# Patient Record
Sex: MALE | Race: WHITE | NOT HISPANIC OR LATINO | URBAN - METROPOLITAN AREA
[De-identification: names, ages, dates, MRNs, and addresses within clinical notes are randomized per-mention and may not be internally consistent; named-entity substitution may affect disease eponyms.]

---

## 2017-07-11 ENCOUNTER — OFFICE (AMBULATORY)
Dept: URBAN - METROPOLITAN AREA CLINIC 75 | Facility: CLINIC | Age: 37
End: 2017-07-11

## 2017-07-11 VITALS
HEIGHT: 74 IN | SYSTOLIC BLOOD PRESSURE: 132 MMHG | DIASTOLIC BLOOD PRESSURE: 70 MMHG | WEIGHT: 225 LBS | HEART RATE: 72 BPM

## 2017-07-11 DIAGNOSIS — R12 HEARTBURN: ICD-10-CM

## 2017-07-11 DIAGNOSIS — Z80.3 FAMILY HISTORY OF MALIGNANT NEOPLASM OF BREAST: ICD-10-CM

## 2017-07-11 PROCEDURE — 99204 OFFICE O/P NEW MOD 45 MIN: CPT

## 2017-07-24 ENCOUNTER — OFFICE (AMBULATORY)
Dept: URBAN - METROPOLITAN AREA CLINIC 75 | Facility: CLINIC | Age: 37
End: 2017-07-24

## 2017-07-24 DIAGNOSIS — Z80.3 FAMILY HISTORY OF MALIGNANT NEOPLASM OF BREAST: ICD-10-CM

## 2017-07-24 PROCEDURE — 82272 OCCULT BLD FECES 1-3 TESTS: CPT

## 2017-08-02 VITALS
WEIGHT: 225 LBS | HEART RATE: 71 BPM | RESPIRATION RATE: 18 BRPM | OXYGEN SATURATION: 98 % | RESPIRATION RATE: 16 BRPM | DIASTOLIC BLOOD PRESSURE: 67 MMHG | SYSTOLIC BLOOD PRESSURE: 138 MMHG | SYSTOLIC BLOOD PRESSURE: 84 MMHG | DIASTOLIC BLOOD PRESSURE: 65 MMHG | HEART RATE: 67 BPM | HEIGHT: 74 IN | RESPIRATION RATE: 17 BRPM | HEART RATE: 79 BPM | DIASTOLIC BLOOD PRESSURE: 77 MMHG | DIASTOLIC BLOOD PRESSURE: 86 MMHG | SYSTOLIC BLOOD PRESSURE: 134 MMHG | TEMPERATURE: 97.3 F | RESPIRATION RATE: 13 BRPM | TEMPERATURE: 97.5 F | RESPIRATION RATE: 8 BRPM | DIASTOLIC BLOOD PRESSURE: 56 MMHG | OXYGEN SATURATION: 97 % | DIASTOLIC BLOOD PRESSURE: 60 MMHG | HEART RATE: 77 BPM | SYSTOLIC BLOOD PRESSURE: 150 MMHG | HEART RATE: 75 BPM | SYSTOLIC BLOOD PRESSURE: 98 MMHG | OXYGEN SATURATION: 99 % | SYSTOLIC BLOOD PRESSURE: 93 MMHG | HEART RATE: 70 BPM

## 2017-08-07 ENCOUNTER — AMBULATORY SURGICAL CENTER (AMBULATORY)
Dept: URBAN - METROPOLITAN AREA SURGERY 17 | Facility: SURGERY | Age: 37
End: 2017-08-07

## 2017-08-07 ENCOUNTER — OFFICE (AMBULATORY)
Dept: URBAN - METROPOLITAN AREA CLINIC 64 | Facility: CLINIC | Age: 37
End: 2017-08-07

## 2017-08-07 DIAGNOSIS — K29.70 GASTRITIS, UNSPECIFIED, WITHOUT BLEEDING: ICD-10-CM

## 2017-08-07 DIAGNOSIS — K22.70 BARRETT'S ESOPHAGUS WITHOUT DYSPLASIA: ICD-10-CM

## 2017-08-07 DIAGNOSIS — K31.7 POLYP OF STOMACH AND DUODENUM: ICD-10-CM

## 2017-08-07 DIAGNOSIS — K29.50 UNSPECIFIED CHRONIC GASTRITIS WITHOUT BLEEDING: ICD-10-CM

## 2017-08-07 DIAGNOSIS — R12 HEARTBURN: ICD-10-CM

## 2017-08-07 DIAGNOSIS — K20.9 ESOPHAGITIS, UNSPECIFIED: ICD-10-CM

## 2017-08-07 LAB
GI HISTOLOGY: A. UNSPECIFIED: (no result)
GI HISTOLOGY: B. SELECT: (no result)
GI HISTOLOGY: C. UNSPECIFIED: (no result)
GI HISTOLOGY: D. UNSPECIFIED: (no result)
GI HISTOLOGY: PDF REPORT: (no result)

## 2017-08-07 PROCEDURE — 88342 IMHCHEM/IMCYTCHM 1ST ANTB: CPT | Mod: 26

## 2017-08-07 PROCEDURE — 88305 TISSUE EXAM BY PATHOLOGIST: CPT

## 2017-08-07 PROCEDURE — 43239 EGD BIOPSY SINGLE/MULTIPLE: CPT

## 2017-08-07 RX ADMIN — PROPOFOL 25 MG: 10 INJECTION, EMULSION INTRAVENOUS at 11:07

## 2017-08-07 RX ADMIN — PROPOFOL 25 MG: 10 INJECTION, EMULSION INTRAVENOUS at 11:09

## 2017-08-07 RX ADMIN — LIDOCAINE HYDROCHLORIDE 50 MG: 10 INJECTION, SOLUTION EPIDURAL; INFILTRATION; INTRACAUDAL; PERINEURAL at 11:02

## 2017-08-07 RX ADMIN — PROPOFOL 50 MG: 10 INJECTION, EMULSION INTRAVENOUS at 11:03

## 2017-08-07 RX ADMIN — PROPOFOL 100 MG: 10 INJECTION, EMULSION INTRAVENOUS at 11:02

## 2017-08-07 RX ADMIN — PROPOFOL 50 MG: 10 INJECTION, EMULSION INTRAVENOUS at 11:04

## 2017-08-07 RX ADMIN — PROPOFOL 50 MG: 10 INJECTION, EMULSION INTRAVENOUS at 11:05

## 2017-12-11 ENCOUNTER — OFFICE VISIT (OUTPATIENT)
Dept: INTERNAL MEDICINE | Facility: CLINIC | Age: 37
End: 2017-12-11

## 2017-12-11 VITALS
SYSTOLIC BLOOD PRESSURE: 128 MMHG | DIASTOLIC BLOOD PRESSURE: 75 MMHG | WEIGHT: 234.1 LBS | RESPIRATION RATE: 16 BRPM | HEIGHT: 74 IN | HEART RATE: 67 BPM | BODY MASS INDEX: 30.04 KG/M2 | TEMPERATURE: 98.2 F

## 2017-12-11 DIAGNOSIS — F41.9 ANXIETY: ICD-10-CM

## 2017-12-11 DIAGNOSIS — E66.3 OVERWEIGHT: ICD-10-CM

## 2017-12-11 DIAGNOSIS — Z00.00 HEALTH CARE MAINTENANCE: ICD-10-CM

## 2017-12-11 DIAGNOSIS — K21.00 GASTROESOPHAGEAL REFLUX DISEASE WITH ESOPHAGITIS: Primary | ICD-10-CM

## 2017-12-11 PROBLEM — J45.909 ASTHMA: Status: ACTIVE | Noted: 2017-12-11

## 2017-12-11 PROBLEM — K21.9 GERD (GASTROESOPHAGEAL REFLUX DISEASE): Status: ACTIVE | Noted: 2017-12-11

## 2017-12-11 PROCEDURE — 99385 PREV VISIT NEW AGE 18-39: CPT | Performed by: FAMILY MEDICINE

## 2017-12-11 RX ORDER — LEVOCETIRIZINE DIHYDROCHLORIDE 5 MG/1
5 TABLET, FILM COATED ORAL EVERY EVENING
COMMUNITY

## 2017-12-11 RX ORDER — PANTOPRAZOLE SODIUM 40 MG/1
40 TABLET, DELAYED RELEASE ORAL DAILY
COMMUNITY
Start: 2017-09-11 | End: 2018-03-09 | Stop reason: ALTCHOICE

## 2017-12-11 RX ORDER — FLUTICASONE PROPIONATE 50 MCG
2 SPRAY, SUSPENSION (ML) NASAL DAILY
COMMUNITY

## 2017-12-11 RX ORDER — ESCITALOPRAM OXALATE 10 MG/1
10 TABLET ORAL DAILY
COMMUNITY
Start: 2017-09-21 | End: 2021-03-24 | Stop reason: DRUGHIGH

## 2017-12-11 RX ORDER — AZELASTINE HCL 205.5 UG/1
1 SPRAY NASAL DAILY
COMMUNITY
Start: 2017-10-01

## 2017-12-11 NOTE — PROGRESS NOTES
Subjective   Jimi Villar is a 37 y.o. male.     Chief Complaint   Patient presents with   • new patient     needs new PCP, sees allergy dr, gastro, and physc for meds   • adult male physical         History of Present Illness   Jimi Villar 37 y.o. male who presents for an Annual Wellness Visit.  he has a history of   Patient Active Problem List   Diagnosis   • Asthma   • Anxiety   • Gastroesophageal reflux disease with esophagitis   • Health care maintenance   • Overweight   .  he has been feeling well.  Labs results discussed in detail with the patient.  Plan to update vaccines if needed today.  I  reviewed health maintenance with him as part of my preventative care plan.    Health Habits:  Dental Exam. up to date  Eye Exam. never  Exercise: 3 times/week.  Current exercise activities include: swimming      The following portions of the patient's history were reviewed and updated as appropriate: allergies, current medications, past family history, past medical history, past social history, past surgical history and problem list.    Review of Systems   Constitutional: Negative for appetite change, fever and unexpected weight change.   HENT: Negative for ear pain, facial swelling and sore throat.    Eyes: Negative for pain and visual disturbance.   Respiratory: Negative for chest tightness, shortness of breath and wheezing.    Cardiovascular: Negative for chest pain and palpitations.   Gastrointestinal: Negative for abdominal pain and blood in stool.   Endocrine: Negative.    Genitourinary: Negative for difficulty urinating and hematuria.   Musculoskeletal: Negative for joint swelling.   Neurological: Negative for tremors, seizures and syncope.   Hematological: Negative for adenopathy.   Psychiatric/Behavioral: Negative.        Objective   Physical Exam   Constitutional: He is oriented to person, place, and time. He appears well-developed and well-nourished. No distress.   HENT:   Head: Normocephalic and  atraumatic.   Right Ear: External ear normal.   Left Ear: External ear normal.   Mouth/Throat: Oropharynx is clear and moist.   Eyes: Conjunctivae and EOM are normal. Pupils are equal, round, and reactive to light. Right eye exhibits no discharge. Left eye exhibits no discharge. No scleral icterus.   Neck: Normal range of motion. Neck supple. No tracheal deviation present. No thyromegaly present.   Cardiovascular: Normal rate, regular rhythm, normal heart sounds, intact distal pulses and normal pulses.  Exam reveals no gallop.    No murmur heard.  Pulmonary/Chest: Effort normal and breath sounds normal. No respiratory distress. He has no wheezes. He has no rales.   Abdominal: Soft. Bowel sounds are normal.   Musculoskeletal: Normal range of motion. He exhibits no edema, tenderness or deformity.   Neurological: He is alert and oriented to person, place, and time. He exhibits normal muscle tone. Coordination normal.   Skin: Skin is warm. No rash noted. No erythema. No pallor.   Psychiatric: He has a normal mood and affect. His behavior is normal. Judgment and thought content normal.   Nursing note and vitals reviewed.      Assessment/Plan   Jimi was seen today for new patient and adult male physical.    Diagnoses and all orders for this visit:    Gastroesophageal reflux disease with esophagitis    Health care maintenance  -     Lipid Panel  -     Comprehensive Metabolic Panel    Anxiety    Overweight  -     Hemoglobin A1c  -     TSH      Continue routine follow-up with psychiatry psychologist as well as GI for ongoing maintenance management of Kaufman's esophagus, he is also following with an allergist.  Follow-up results of blood work and as needed or annually

## 2017-12-15 LAB
ALBUMIN SERPL-MCNC: 4.4 G/DL (ref 3.5–5.2)
ALBUMIN/GLOB SERPL: 1.6 G/DL
ALP SERPL-CCNC: 80 U/L (ref 39–117)
ALT SERPL-CCNC: 68 U/L (ref 1–41)
AST SERPL-CCNC: 67 U/L (ref 1–40)
BILIRUB SERPL-MCNC: 0.2 MG/DL (ref 0.1–1.2)
BUN SERPL-MCNC: 14 MG/DL (ref 6–20)
BUN/CREAT SERPL: 12.4 (ref 7–25)
CALCIUM SERPL-MCNC: 8.9 MG/DL (ref 8.6–10.5)
CHLORIDE SERPL-SCNC: 102 MMOL/L (ref 98–107)
CHOLEST SERPL-MCNC: 174 MG/DL (ref 0–200)
CO2 SERPL-SCNC: 26.3 MMOL/L (ref 22–29)
CREAT SERPL-MCNC: 1.13 MG/DL (ref 0.76–1.27)
GFR SERPLBLD CREATININE-BSD FMLA CKD-EPI: 72 ML/MIN/1.73
GFR SERPLBLD CREATININE-BSD FMLA CKD-EPI: 88 ML/MIN/1.73
GLOBULIN SER CALC-MCNC: 2.8 GM/DL
GLUCOSE SERPL-MCNC: 95 MG/DL (ref 65–99)
HBA1C MFR BLD: 5.37 % (ref 4.8–5.6)
HDLC SERPL-MCNC: 37 MG/DL (ref 40–60)
LDLC SERPL CALC-MCNC: 90 MG/DL (ref 0–100)
POTASSIUM SERPL-SCNC: 4.5 MMOL/L (ref 3.5–5.2)
PROT SERPL-MCNC: 7.2 G/DL (ref 6–8.5)
SODIUM SERPL-SCNC: 142 MMOL/L (ref 136–145)
TRIGL SERPL-MCNC: 237 MG/DL (ref 0–150)
TSH SERPL DL<=0.005 MIU/L-ACNC: 1.3 MIU/ML (ref 0.27–4.2)
VLDLC SERPL CALC-MCNC: 47.4 MG/DL (ref 5–40)

## 2017-12-19 ENCOUNTER — TELEPHONE (OUTPATIENT)
Dept: INTERNAL MEDICINE | Facility: CLINIC | Age: 37
End: 2017-12-19

## 2017-12-19 DIAGNOSIS — R74.8 ELEVATED LIVER ENZYMES: Primary | ICD-10-CM

## 2017-12-19 NOTE — TELEPHONE ENCOUNTER
----- Message from Dov Blanco MD sent at 12/18/2017  5:08 PM EST -----  Follow-up with Dr. Reid for elevated liver enzymes

## 2018-01-05 ENCOUNTER — OFFICE (AMBULATORY)
Dept: URBAN - METROPOLITAN AREA CLINIC 75 | Facility: CLINIC | Age: 38
End: 2018-01-05

## 2018-01-05 VITALS
WEIGHT: 232 LBS | HEART RATE: 76 BPM | DIASTOLIC BLOOD PRESSURE: 90 MMHG | SYSTOLIC BLOOD PRESSURE: 160 MMHG | HEIGHT: 74 IN

## 2018-01-05 DIAGNOSIS — K20.9 ESOPHAGITIS, UNSPECIFIED: ICD-10-CM

## 2018-01-05 DIAGNOSIS — R94.5 ABNORMAL RESULTS OF LIVER FUNCTION STUDIES: ICD-10-CM

## 2018-01-05 DIAGNOSIS — R12 HEARTBURN: ICD-10-CM

## 2018-01-05 DIAGNOSIS — Z80.3 FAMILY HISTORY OF MALIGNANT NEOPLASM OF BREAST: ICD-10-CM

## 2018-01-05 DIAGNOSIS — K29.70 GASTRITIS, UNSPECIFIED, WITHOUT BLEEDING: ICD-10-CM

## 2018-01-05 PROCEDURE — 99214 OFFICE O/P EST MOD 30 MIN: CPT

## 2018-01-23 ENCOUNTER — HOSPITAL ENCOUNTER (OUTPATIENT)
Dept: GENERAL RADIOLOGY | Facility: HOSPITAL | Age: 38
Discharge: HOME | End: 2018-01-23
Admitting: FAMILY MEDICINE

## 2018-01-23 ENCOUNTER — OFFICE VISIT (OUTPATIENT)
Dept: INTERNAL MEDICINE | Facility: CLINIC | Age: 38
End: 2018-01-23

## 2018-01-23 VITALS
HEIGHT: 74 IN | OXYGEN SATURATION: 98 % | SYSTOLIC BLOOD PRESSURE: 106 MMHG | WEIGHT: 225.3 LBS | HEART RATE: 70 BPM | BODY MASS INDEX: 28.91 KG/M2 | TEMPERATURE: 97 F | DIASTOLIC BLOOD PRESSURE: 62 MMHG

## 2018-01-23 DIAGNOSIS — R05.9 COUGH: Primary | ICD-10-CM

## 2018-01-23 DIAGNOSIS — J40 BRONCHITIS: ICD-10-CM

## 2018-01-23 PROCEDURE — 99213 OFFICE O/P EST LOW 20 MIN: CPT | Performed by: FAMILY MEDICINE

## 2018-01-23 PROCEDURE — 71046 X-RAY EXAM CHEST 2 VIEWS: CPT

## 2018-01-23 RX ORDER — AZITHROMYCIN 250 MG/1
TABLET, FILM COATED ORAL
Qty: 6 TABLET | Refills: 0 | Status: SHIPPED | OUTPATIENT
Start: 2018-01-23 | End: 2018-03-09

## 2018-01-23 RX ORDER — PREDNISONE 10 MG/1
TABLET ORAL
Qty: 15 TABLET | Refills: 0 | Status: SHIPPED | OUTPATIENT
Start: 2018-01-23 | End: 2018-03-09

## 2018-01-23 RX ORDER — ALBUTEROL SULFATE 90 UG/1
2 AEROSOL, METERED RESPIRATORY (INHALATION) EVERY 6 HOURS PRN
Qty: 1 INHALER | Refills: 1 | Status: SHIPPED | OUTPATIENT
Start: 2018-01-23

## 2018-01-23 RX ORDER — ALBUTEROL SULFATE 90 UG/1
1 AEROSOL, METERED RESPIRATORY (INHALATION) DAILY PRN
COMMUNITY
End: 2018-01-23 | Stop reason: SDUPTHER

## 2018-01-23 NOTE — PROGRESS NOTES
"Jimi Villar is a 37 y.o. male.      Assessment/Plan   Problem List Items Addressed This Visit        Respiratory    Cough - Primary    Relevant Orders    XR Chest 2 View    Bronchitis    Relevant Medications    albuterol (PROAIR HFA) 108 (90 Base) MCG/ACT inhaler           Follow-up results of chest x-ray  Prednisone with Z-Hussain follow-up if no significant improvement within a week      Chief Complaint   Patient presents with   • cough since [place]     Social History   Substance Use Topics   • Smoking status: Never Smoker   • Smokeless tobacco: Never Used   • Alcohol use Yes      Comment: 2-3 per day       History of Present Illness   With cough for greater than a month that was gradually improving then recurrence after flight to [place] with worsening sweats low-grade fever and productive sputum no green no muscle aches most her throat or earache he's been using her inhaler intermittently with some benefit but it's an old inhaler.   Allergist and no change in those medications  The following portions of the patient's history were reviewed and updated as appropriate:PMHroutine: Social history , Past Medical History, Allergies, Current Medications, Active Problem List and Health Maintenance    Review of Systems   Constitutional: Negative.    HENT: Negative.    Respiratory: Positive for cough and shortness of breath.    Gastrointestinal: Negative.    Hematological: Negative.        Objective   Vitals:    01/23/18 0823   BP: 106/62   BP Location: Left arm   Patient Position: Sitting   Cuff Size: Large Adult   Pulse: 70   Temp: 97 °F (36.1 °C)   TempSrc: Oral   SpO2: 98%   Weight: 102 kg (225 lb 4.8 oz)   Height: 188 cm (74\")     Body mass index is 28.93 kg/(m^2).  Physical Exam   Constitutional: He appears well-developed and well-nourished.   HENT:   Head: Normocephalic and atraumatic.   Right Ear: External ear normal.   Left Ear: External ear normal.   Nose: Nose normal.   Eyes: Conjunctivae are normal. Pupils " are equal, round, and reactive to light.   Neck: Normal range of motion.   Cardiovascular: Normal rate and regular rhythm.    Pulmonary/Chest: Effort normal. He has wheezes.   Lymphadenopathy:     He has no cervical adenopathy.   Psychiatric: He has a normal mood and affect. His behavior is normal. Judgment and thought content normal.   Nursing note and vitals reviewed.    Reviewed Data:  No visits with results within 1 Month(s) from this visit.  Latest known visit with results is:    Office Visit on 12/11/2017   Component Date Value Ref Range Status   • Total Cholesterol 12/15/2017 174  0 - 200 mg/dL Final   • Triglycerides 12/15/2017 237* 0 - 150 mg/dL Final   • HDL Cholesterol 12/15/2017 37* 40 - 60 mg/dL Final   • VLDL Cholesterol 12/15/2017 47.4* 5 - 40 mg/dL Final   • LDL Cholesterol  12/15/2017 90  0 - 100 mg/dL Final   • Glucose 12/15/2017 95  65 - 99 mg/dL Final   • BUN 12/15/2017 14  6 - 20 mg/dL Final   • Creatinine 12/15/2017 1.13  0.76 - 1.27 mg/dL Final   • eGFR Non  Am 12/15/2017 72  >60 mL/min/1.73 Final   • eGFR African Am 12/15/2017 88  >60 mL/min/1.73 Final   • BUN/Creatinine Ratio 12/15/2017 12.4  7.0 - 25.0 Final   • Sodium 12/15/2017 142  136 - 145 mmol/L Final   • Potassium 12/15/2017 4.5  3.5 - 5.2 mmol/L Final   • Chloride 12/15/2017 102  98 - 107 mmol/L Final   • Total CO2 12/15/2017 26.3  22.0 - 29.0 mmol/L Final   • Calcium 12/15/2017 8.9  8.6 - 10.5 mg/dL Final   • Total Protein 12/15/2017 7.2  6.0 - 8.5 g/dL Final   • Albumin 12/15/2017 4.40  3.50 - 5.20 g/dL Final   • Globulin 12/15/2017 2.8  gm/dL Final   • A/G Ratio 12/15/2017 1.6  g/dL Final   • Total Bilirubin 12/15/2017 0.2  0.1 - 1.2 mg/dL Final   • Alkaline Phosphatase 12/15/2017 80  39 - 117 U/L Final   • AST (SGOT) 12/15/2017 67* 1 - 40 U/L Final   • ALT (SGPT) 12/15/2017 68* 1 - 41 U/L Final   • Hemoglobin A1C 12/15/2017 5.37  4.80 - 5.60 % Final    Comment: Hemoglobin A1C Ranges:  Increased Risk for Diabetes  5.7% to  6.4%  Diabetes                     >= 6.5%  Diabetic Goal                < 7.0%     • TSH 12/15/2017 1.300  0.270 - 4.200 mIU/mL Final

## 2018-01-24 ENCOUNTER — TELEPHONE (OUTPATIENT)
Dept: INTERNAL MEDICINE | Facility: CLINIC | Age: 38
End: 2018-01-24

## 2018-01-24 NOTE — TELEPHONE ENCOUNTER
----- Message from Dov Blanco MD sent at 1/23/2018  4:55 PM EST -----  Chest x-ray no acute illness.  OK to Use prednisone

## 2018-02-09 ENCOUNTER — OFFICE (AMBULATORY)
Dept: URBAN - METROPOLITAN AREA CLINIC 75 | Facility: CLINIC | Age: 38
End: 2018-02-09

## 2018-02-09 VITALS
HEIGHT: 74 IN | HEART RATE: 80 BPM | SYSTOLIC BLOOD PRESSURE: 150 MMHG | DIASTOLIC BLOOD PRESSURE: 80 MMHG | WEIGHT: 230 LBS

## 2018-02-09 DIAGNOSIS — Z80.3 FAMILY HISTORY OF MALIGNANT NEOPLASM OF BREAST: ICD-10-CM

## 2018-02-09 DIAGNOSIS — R12 HEARTBURN: ICD-10-CM

## 2018-02-09 DIAGNOSIS — K29.70 GASTRITIS, UNSPECIFIED, WITHOUT BLEEDING: ICD-10-CM

## 2018-02-09 DIAGNOSIS — K20.9 ESOPHAGITIS, UNSPECIFIED: ICD-10-CM

## 2018-02-09 DIAGNOSIS — K22.70 BARRETT'S ESOPHAGUS WITHOUT DYSPLASIA: ICD-10-CM

## 2018-02-09 DIAGNOSIS — R94.5 ABNORMAL RESULTS OF LIVER FUNCTION STUDIES: ICD-10-CM

## 2018-02-09 PROCEDURE — 99214 OFFICE O/P EST MOD 30 MIN: CPT

## 2018-02-09 RX ORDER — LANSOPRAZOLE 30 MG/1
30 CAPSULE, DELAYED RELEASE PELLETS ORAL
Qty: 90 | Refills: 4 | Status: ACTIVE
Start: 2018-02-09

## 2018-03-09 ENCOUNTER — OFFICE VISIT (OUTPATIENT)
Dept: INTERNAL MEDICINE | Facility: CLINIC | Age: 38
End: 2018-03-09

## 2018-03-09 VITALS
WEIGHT: 219.2 LBS | HEART RATE: 62 BPM | DIASTOLIC BLOOD PRESSURE: 80 MMHG | HEIGHT: 74 IN | SYSTOLIC BLOOD PRESSURE: 112 MMHG | OXYGEN SATURATION: 98 % | TEMPERATURE: 97.9 F | BODY MASS INDEX: 28.13 KG/M2

## 2018-03-09 DIAGNOSIS — J45.40 MODERATE PERSISTENT ASTHMA WITHOUT COMPLICATION: Primary | ICD-10-CM

## 2018-03-09 PROBLEM — J40 BRONCHITIS: Status: RESOLVED | Noted: 2018-01-23 | Resolved: 2018-03-09

## 2018-03-09 PROCEDURE — 99213 OFFICE O/P EST LOW 20 MIN: CPT | Performed by: FAMILY MEDICINE

## 2018-03-09 RX ORDER — LANSOPRAZOLE 30 MG/1
30 CAPSULE, DELAYED RELEASE ORAL DAILY
COMMUNITY
Start: 2018-02-09 | End: 2018-07-18

## 2018-03-09 NOTE — PROGRESS NOTES
Jimi Villar is a 37 y.o. male.      Assessment/Plan   Problem List Items Addressed This Visit        Respiratory    Asthma - Primary    Relevant Medications    mometasone-formoterol (DULERA) 200-5 MCG/ACT inhaler             Start Dulera 200/52 puffs twice a day prescription sent to [facility] may need to be able to wean down after the spring allergy season 200/5 twice a day was follow-up as needed or in 6 months    Chief Complaint   Patient presents with   • Cough (continued)     since [place]     Social History   Substance Use Topics   • Smoking status: Never Smoker   • Smokeless tobacco: Never Used   • Alcohol use Yes      Comment: 2-3 per day       History of Present Illness   Patient has long-standing history of asthma that has been well controlled in the past with intermittent use of albuterol seen an allergist regularly he is taking medication without control his upper respiratory symptoms as well as oral antihistamine he has not been using any oral steroids although when he had his previous attacks seem to improve mostly with the oral steroid and then would have recurrence of symptoms has been taking his albuterol inhaler 3 times a day.  No fever no sweats and no chills  The following portions of the patient's history were reviewed and updated as appropriate:PMHroutine: Social history , Past Medical History, Allergies, Current Medications, Active Problem List, Family History and Health Maintenance    Review of Systems   Constitutional: Negative.    HENT: Negative.    Respiratory: Positive for cough and wheezing.    Cardiovascular: Negative.    Gastrointestinal: Negative.    Musculoskeletal: Negative.    Allergic/Immunologic: Positive for environmental allergies. Negative for immunocompromised state.   Neurological: Negative.        Objective   Vitals:    03/09/18 1032   BP: 112/80   BP Location: Right arm   Patient Position: Sitting   Cuff Size: Large Adult   Pulse: 62   Temp: 97.9 °F (36.6 °C)  "  TempSrc: Oral   SpO2: 98%   Weight: 99.4 kg (219 lb 3.2 oz)   Height: 188 cm (74\")     Body mass index is 28.14 kg/(m^2).  Physical Exam   Constitutional: He appears well-developed and well-nourished.   HENT:   Head: Normocephalic and atraumatic.   Right Ear: External ear normal.   Left Ear: External ear normal.   Mouth/Throat: Oropharynx is clear and moist.   Eyes: Conjunctivae are normal. Pupils are equal, round, and reactive to light.   Neck: No thyromegaly present.   Cardiovascular: Normal rate and regular rhythm.    Pulmonary/Chest: Effort normal and breath sounds normal.   Lymphadenopathy:     He has no cervical adenopathy.   Psychiatric: He has a normal mood and affect. His behavior is normal. Judgment and thought content normal.     Reviewed Data:  No visits with results within 1 Month(s) from this visit.  Latest known visit with results is:    Office Visit on 12/11/2017   Component Date Value Ref Range Status   • Total Cholesterol 12/15/2017 174  0 - 200 mg/dL Final   • Triglycerides 12/15/2017 237* 0 - 150 mg/dL Final   • HDL Cholesterol 12/15/2017 37* 40 - 60 mg/dL Final   • VLDL Cholesterol 12/15/2017 47.4* 5 - 40 mg/dL Final   • LDL Cholesterol  12/15/2017 90  0 - 100 mg/dL Final   • Glucose 12/15/2017 95  65 - 99 mg/dL Final   • BUN 12/15/2017 14  6 - 20 mg/dL Final   • Creatinine 12/15/2017 1.13  0.76 - 1.27 mg/dL Final   • eGFR Non  Am 12/15/2017 72  >60 mL/min/1.73 Final   • eGFR African Am 12/15/2017 88  >60 mL/min/1.73 Final   • BUN/Creatinine Ratio 12/15/2017 12.4  7.0 - 25.0 Final   • Sodium 12/15/2017 142  136 - 145 mmol/L Final   • Potassium 12/15/2017 4.5  3.5 - 5.2 mmol/L Final   • Chloride 12/15/2017 102  98 - 107 mmol/L Final   • Total CO2 12/15/2017 26.3  22.0 - 29.0 mmol/L Final   • Calcium 12/15/2017 8.9  8.6 - 10.5 mg/dL Final   • Total Protein 12/15/2017 7.2  6.0 - 8.5 g/dL Final   • Albumin 12/15/2017 4.40  3.50 - 5.20 g/dL Final   • Globulin 12/15/2017 2.8  gm/dL Final   • " A/G Ratio 12/15/2017 1.6  g/dL Final   • Total Bilirubin 12/15/2017 0.2  0.1 - 1.2 mg/dL Final   • Alkaline Phosphatase 12/15/2017 80  39 - 117 U/L Final   • AST (SGOT) 12/15/2017 67* 1 - 40 U/L Final   • ALT (SGPT) 12/15/2017 68* 1 - 41 U/L Final   • Hemoglobin A1C 12/15/2017 5.37  4.80 - 5.60 % Final    Comment: Hemoglobin A1C Ranges:  Increased Risk for Diabetes  5.7% to 6.4%  Diabetes                     >= 6.5%  Diabetic Goal                < 7.0%     • TSH 12/15/2017 1.300  0.270 - 4.200 mIU/mL Final

## 2018-06-19 ENCOUNTER — OFFICE (AMBULATORY)
Dept: URBAN - METROPOLITAN AREA CLINIC 75 | Facility: CLINIC | Age: 38
End: 2018-06-19

## 2018-06-19 VITALS
SYSTOLIC BLOOD PRESSURE: 120 MMHG | WEIGHT: 217 LBS | HEART RATE: 68 BPM | HEIGHT: 74 IN | DIASTOLIC BLOOD PRESSURE: 80 MMHG

## 2018-06-19 DIAGNOSIS — Z80.3 FAMILY HISTORY OF MALIGNANT NEOPLASM OF BREAST: ICD-10-CM

## 2018-06-19 DIAGNOSIS — K31.7 POLYP OF STOMACH AND DUODENUM: ICD-10-CM

## 2018-06-19 DIAGNOSIS — R12 HEARTBURN: ICD-10-CM

## 2018-06-19 DIAGNOSIS — R94.5 ABNORMAL RESULTS OF LIVER FUNCTION STUDIES: ICD-10-CM

## 2018-06-19 DIAGNOSIS — K22.70 BARRETT'S ESOPHAGUS WITHOUT DYSPLASIA: ICD-10-CM

## 2018-06-19 PROCEDURE — 99214 OFFICE O/P EST MOD 30 MIN: CPT

## 2018-07-18 ENCOUNTER — OFFICE VISIT (OUTPATIENT)
Dept: INTERNAL MEDICINE | Facility: CLINIC | Age: 38
End: 2018-07-18

## 2018-07-18 VITALS
SYSTOLIC BLOOD PRESSURE: 100 MMHG | HEIGHT: 74 IN | WEIGHT: 212 LBS | BODY MASS INDEX: 27.21 KG/M2 | HEART RATE: 74 BPM | DIASTOLIC BLOOD PRESSURE: 66 MMHG | TEMPERATURE: 98.3 F | OXYGEN SATURATION: 99 %

## 2018-07-18 DIAGNOSIS — K21.00 GASTROESOPHAGEAL REFLUX DISEASE WITH ESOPHAGITIS: Primary | ICD-10-CM

## 2018-07-18 DIAGNOSIS — R05.9 COUGH: ICD-10-CM

## 2018-07-18 DIAGNOSIS — J02.9 SORE THROAT: ICD-10-CM

## 2018-07-18 PROCEDURE — 99213 OFFICE O/P EST LOW 20 MIN: CPT | Performed by: FAMILY MEDICINE

## 2018-07-18 RX ORDER — DEXLANSOPRAZOLE 60 MG/1
60 CAPSULE, DELAYED RELEASE ORAL DAILY
Qty: 30 CAPSULE | Refills: 0 | Status: SHIPPED | OUTPATIENT
Start: 2018-07-18 | End: 2018-08-17

## 2018-07-18 RX ORDER — AMOXICILLIN AND CLAVULANATE POTASSIUM 875; 125 MG/1; MG/1
TABLET, FILM COATED ORAL
COMMUNITY
Start: 2018-07-11 | End: 2018-07-18

## 2018-07-18 NOTE — PROGRESS NOTES
"Jimi Villar is a 37 y.o. male.      Assessment/Plan   Problem List Items Addressed This Visit        Respiratory    Cough    Sore throat       Digestive    Gastroesophageal reflux disease with esophagitis - Primary    Overview     [facility] EDG 8/ 2017         Relevant Medications    dexlansoprazole (DEXILANT) 60 MG capsule           Recommend consultation with ENT for voice change and persistent sore throat many months with history of GERD      Chief Complaint   Patient presents with   • [facility] follow up to sinus infection     Social History   Substance Use Topics   • Smoking status: Never Smoker   • Smokeless tobacco: Never Used   • Alcohol use Yes      Comment: 2-3 per day       History of Present Illness   Patient follow-up from recent urgent care visit given antibiotics and inhaler for sore throat that has been long-standing and he has had recent EGD which was normal his taking PPI Terrell Hakeem because his insurance would not pay for excellent although it has worked best for him he also has persistent cough is never had any visualization of his throat in feels that minimal improvement with antibiotics  The following portions of the patient's history were reviewed and updated as appropriate:PMHroutine: Social history , Past Medical History, Surgical history , Allergies, Current Medications, Active Problem List, Family History and Health Maintenance    Review of Systems   Constitutional: Negative.    HENT: Positive for congestion, sore throat and voice change. Negative for nosebleeds, postnasal drip and rhinorrhea.    Respiratory: Positive for cough. Negative for choking and chest tightness.    Cardiovascular: Negative.    Gastrointestinal: Negative.        Objective   Vitals:    07/18/18 1102   BP: 100/66   BP Location: Left arm   Patient Position: Sitting   Cuff Size: Large Adult   Pulse: 74   Temp: 98.3 °F (36.8 °C)   TempSrc: Oral   SpO2: 99%   Weight: 96.2 kg (212 lb)   Height: 188 cm (74\") "     Body mass index is 27.22 kg/m².  Physical Exam   Constitutional: He appears well-developed and well-nourished.   HENT:   Head: Normocephalic and atraumatic.   Left Ear: External ear normal.   Nose: Nose normal.   Eyes: Pupils are equal, round, and reactive to light. Conjunctivae are normal. No scleral icterus.   Neck: Normal range of motion. No thyromegaly present.   Cardiovascular: Normal rate and regular rhythm.    Pulmonary/Chest: Effort normal and breath sounds normal.   Musculoskeletal: He exhibits no edema.   Lymphadenopathy:     He has no cervical adenopathy.   Nursing note and vitals reviewed.    Reviewed Data:  No visits with results within 1 Month(s) from this visit.   Latest known visit with results is:   Office Visit on 12/11/2017   Component Date Value Ref Range Status   • Total Cholesterol 12/15/2017 174  0 - 200 mg/dL Final   • Triglycerides 12/15/2017 237* 0 - 150 mg/dL Final   • HDL Cholesterol 12/15/2017 37* 40 - 60 mg/dL Final   • VLDL Cholesterol 12/15/2017 47.4* 5 - 40 mg/dL Final   • LDL Cholesterol  12/15/2017 90  0 - 100 mg/dL Final   • Glucose 12/15/2017 95  65 - 99 mg/dL Final   • BUN 12/15/2017 14  6 - 20 mg/dL Final   • Creatinine 12/15/2017 1.13  0.76 - 1.27 mg/dL Final   • eGFR Non  Am 12/15/2017 72  >60 mL/min/1.73 Final   • eGFR African Am 12/15/2017 88  >60 mL/min/1.73 Final   • BUN/Creatinine Ratio 12/15/2017 12.4  7.0 - 25.0 Final   • Sodium 12/15/2017 142  136 - 145 mmol/L Final   • Potassium 12/15/2017 4.5  3.5 - 5.2 mmol/L Final   • Chloride 12/15/2017 102  98 - 107 mmol/L Final   • Total CO2 12/15/2017 26.3  22.0 - 29.0 mmol/L Final   • Calcium 12/15/2017 8.9  8.6 - 10.5 mg/dL Final   • Total Protein 12/15/2017 7.2  6.0 - 8.5 g/dL Final   • Albumin 12/15/2017 4.40  3.50 - 5.20 g/dL Final   • Globulin 12/15/2017 2.8  gm/dL Final   • A/G Ratio 12/15/2017 1.6  g/dL Final   • Total Bilirubin 12/15/2017 0.2  0.1 - 1.2 mg/dL Final   • Alkaline Phosphatase 12/15/2017 80  39  - 117 U/L Final   • AST (SGOT) 12/15/2017 67* 1 - 40 U/L Final   • ALT (SGPT) 12/15/2017 68* 1 - 41 U/L Final   • Hemoglobin A1C 12/15/2017 5.37  4.80 - 5.60 % Final    Comment: Hemoglobin A1C Ranges:  Increased Risk for Diabetes  5.7% to 6.4%  Diabetes                     >= 6.5%  Diabetic Goal                < 7.0%     • TSH 12/15/2017 1.300  0.270 - 4.200 mIU/mL Final

## 2018-08-15 ENCOUNTER — TELEPHONE (OUTPATIENT)
Dept: INTERNAL MEDICINE | Facility: CLINIC | Age: 38
End: 2018-08-15

## 2018-08-15 NOTE — TELEPHONE ENCOUNTER
Dexilant PA denied.  Patient notified, insurance states patient must try and fail the following omeprazole, pantoprazole, esomeprazole, rabeprazole.  Patient states he will see his GI for rx

## 2019-02-12 ENCOUNTER — OFFICE VISIT (OUTPATIENT)
Dept: INTERNAL MEDICINE | Facility: CLINIC | Age: 39
End: 2019-02-12

## 2019-02-12 VITALS
HEIGHT: 74 IN | WEIGHT: 215.9 LBS | OXYGEN SATURATION: 99 % | SYSTOLIC BLOOD PRESSURE: 112 MMHG | HEART RATE: 71 BPM | DIASTOLIC BLOOD PRESSURE: 64 MMHG | RESPIRATION RATE: 16 BRPM | BODY MASS INDEX: 27.71 KG/M2

## 2019-02-12 DIAGNOSIS — M79.18 MUSCULOSKELETAL PAIN: Primary | ICD-10-CM

## 2019-02-12 PROCEDURE — 99213 OFFICE O/P EST LOW 20 MIN: CPT | Performed by: FAMILY MEDICINE

## 2019-02-12 RX ORDER — ESCITALOPRAM OXALATE 5 MG/1
5 TABLET ORAL DAILY
COMMUNITY
Start: 2018-05-01 | End: 2021-03-24 | Stop reason: DRUGHIGH

## 2019-02-12 NOTE — PROGRESS NOTES
"Jimi Villar is a 38 y.o. male.      Assessment/Plan   Problem List Items Addressed This Visit        Nervous and Auditory    Musculoskeletal pain - Primary         Recommend anti-inflammatories for groin pain if worsens or becomes testicular in nature follow-up appointment        Chief Complaint   Patient presents with   • left side abdominal and testicle pain     x 3 weeks     Social History     Tobacco Use   • Smoking status: Never Smoker   • Smokeless tobacco: Never Used   Substance Use Topics   • Alcohol use: Yes     Comment: 2-3 per day   • Drug use: No       History of Present Illness   Patient comes in for pain in his left groin for the last 3 weeks or so he is increase his physical activities and attempts to get in shape to go skiing he does not remember any acute injury has been doing some lifting stretching squatting there is no family history of testicular disorders or hernias pain is persistent seems to initiate in the groin and shoot into his left scrotum the following portions of the patient's history were reviewed and updated as appropriate:PMHroutine: Social history , Past Medical History, Allergies, Current Medications, Active Problem List and Health Maintenance    Review of Systems   Constitutional: Negative.    Respiratory: Negative.    Cardiovascular: Negative.    Gastrointestinal: Negative.    Genitourinary: Negative for decreased urine volume, difficulty urinating, flank pain, frequency and scrotal swelling.       Objective   Vitals:    02/12/19 0958   BP: 112/64   BP Location: Left arm   Patient Position: Sitting   Cuff Size: Large Adult   Pulse: 71   Resp: 16   SpO2: 99%   Weight: 97.9 kg (215 lb 14.4 oz)   Height: 188 cm (74\")     Body mass index is 27.72 kg/m².  Physical Exam   Constitutional: He appears well-developed and well-nourished.   Abdominal: Soft. Bowel sounds are normal. He exhibits no distension and no mass. There is tenderness. There is no rebound and no guarding. No " hernia. Hernia confirmed negative in the left inguinal area.   Genitourinary: Rectum normal and penis normal. Right testis shows no swelling and no tenderness. Right testis is descended. Cremasteric reflex is not absent on the right side. Left testis shows no swelling and no tenderness. Left testis is descended. Cremasteric reflex is not absent on the left side.         Lymphadenopathy: No inguinal adenopathy noted on the left side.   Nursing note and vitals reviewed.    Reviewed Data:  No visits with results within 1 Month(s) from this visit.   Latest known visit with results is:   Office Visit on 12/11/2017   Component Date Value Ref Range Status   • Total Cholesterol 12/15/2017 174  0 - 200 mg/dL Final   • Triglycerides 12/15/2017 237* 0 - 150 mg/dL Final   • HDL Cholesterol 12/15/2017 37* 40 - 60 mg/dL Final   • VLDL Cholesterol 12/15/2017 47.4* 5 - 40 mg/dL Final   • LDL Cholesterol  12/15/2017 90  0 - 100 mg/dL Final   • Glucose 12/15/2017 95  65 - 99 mg/dL Final   • BUN 12/15/2017 14  6 - 20 mg/dL Final   • Creatinine 12/15/2017 1.13  0.76 - 1.27 mg/dL Final   • eGFR Non  Am 12/15/2017 72  >60 mL/min/1.73 Final   • eGFR African Am 12/15/2017 88  >60 mL/min/1.73 Final   • BUN/Creatinine Ratio 12/15/2017 12.4  7.0 - 25.0 Final   • Sodium 12/15/2017 142  136 - 145 mmol/L Final   • Potassium 12/15/2017 4.5  3.5 - 5.2 mmol/L Final   • Chloride 12/15/2017 102  98 - 107 mmol/L Final   • Total CO2 12/15/2017 26.3  22.0 - 29.0 mmol/L Final   • Calcium 12/15/2017 8.9  8.6 - 10.5 mg/dL Final   • Total Protein 12/15/2017 7.2  6.0 - 8.5 g/dL Final   • Albumin 12/15/2017 4.40  3.50 - 5.20 g/dL Final   • Globulin 12/15/2017 2.8  gm/dL Final   • A/G Ratio 12/15/2017 1.6  g/dL Final   • Total Bilirubin 12/15/2017 0.2  0.1 - 1.2 mg/dL Final   • Alkaline Phosphatase 12/15/2017 80  39 - 117 U/L Final   • AST (SGOT) 12/15/2017 67* 1 - 40 U/L Final   • ALT (SGPT) 12/15/2017 68* 1 - 41 U/L Final   • Hemoglobin A1C 12/15/2017  5.37  4.80 - 5.60 % Final    Comment: Hemoglobin A1C Ranges:  Increased Risk for Diabetes  5.7% to 6.4%  Diabetes                     >= 6.5%  Diabetic Goal                < 7.0%     • TSH 12/15/2017 1.300  0.270 - 4.200 mIU/mL Final

## 2019-04-09 ENCOUNTER — TELEPHONE (OUTPATIENT)
Dept: INTERNAL MEDICINE | Facility: CLINIC | Age: 39
End: 2019-04-09

## 2019-04-09 NOTE — TELEPHONE ENCOUNTER
Regarding: Visit Follow-Up Question  Contact: [phone]  .    ----- Message -----  From: Jimi Villar  Sent: 4/8/2019   5:28 PM  To: Esvin Montero [facility]  Subject: Visit Follow-Up Question                         ----- Message from Mychart, Generic sent at 4/8/2019  5:28 PM EDT -----    The pain that prompted my last visit is still an issue.  It is not constant, but continues to persist.  It continues to hurt throughout my lower abdomen and has occasionally even been in my lower back.  Checking to see if there is a logical next step or if I need to schedule a follow-up appointment?

## 2019-04-12 ENCOUNTER — OFFICE VISIT (OUTPATIENT)
Dept: INTERNAL MEDICINE | Facility: CLINIC | Age: 39
End: 2019-04-12

## 2019-04-12 VITALS
BODY MASS INDEX: 28.02 KG/M2 | HEIGHT: 76 IN | TEMPERATURE: 98.1 F | SYSTOLIC BLOOD PRESSURE: 120 MMHG | OXYGEN SATURATION: 97 % | HEART RATE: 72 BPM | DIASTOLIC BLOOD PRESSURE: 72 MMHG | WEIGHT: 230.1 LBS

## 2019-04-12 DIAGNOSIS — K40.90 NON-RECURRENT UNILATERAL INGUINAL HERNIA WITHOUT OBSTRUCTION OR GANGRENE: Primary | ICD-10-CM

## 2019-04-12 PROCEDURE — 99213 OFFICE O/P EST LOW 20 MIN: CPT | Performed by: FAMILY MEDICINE

## 2019-04-12 NOTE — PROGRESS NOTES
"Jimi Villar is a 38 y.o. male.      Assessment/Plan   Problem List Items Addressed This Visit        Other    Non-recurrent unilateral inguinal hernia without obstruction or gangrene - Primary    Overview     Right                Patient declines general surgery consultation since he is gradually feeling better monitor for signs and symptoms of deterioration follow-up otherwise as needed  Return if symptoms worsen or fail to improve, for Recheck.      Chief Complaint   Patient presents with   • Follow Up to Abdominal Pain     6 week follow up, slightly better     Social History     Tobacco Use   • Smoking status: Never Smoker   • Smokeless tobacco: Never Used   Substance Use Topics   • Alcohol use: Yes     Comment: 2-3 per day   • Drug use: No       History of Present Illness   Patient was 6 weeks abdominal pain had use anti-inflammatories intermittently and gradually this last week is much improved its more of an annoyance than persistent pain not related any specific activity is been no effective sexual activity no change in bowel habits no urine habit changes no fever sweats or chills points to the pelvic region but just discomfort is not having testicle tenderness  The following portions of the patient's history were reviewed and updated as appropriate:PMHroutine: Social history , Allergies, Current Medications, Active Problem List and Health Maintenance    Review of Systems   Constitutional: Negative.    Cardiovascular: Negative.    Genitourinary: Negative.    Musculoskeletal: Negative.    Psychiatric/Behavioral: The patient is nervous/anxious.        Objective   Vitals:    04/12/19 1440   BP: 120/72   BP Location: Right arm   Patient Position: Sitting   Cuff Size: Adult   Pulse: 72   Temp: 98.1 °F (36.7 °C)   TempSrc: Oral   SpO2: 97%   Weight: 104 kg (230 lb 1.6 oz)   Height: 193 cm (76\")     Body mass index is 28.01 kg/m².  Physical Exam   Constitutional: He is oriented to person, place, and time. He " appears well-developed and well-nourished.   Eyes: Pupils are equal, round, and reactive to light. No scleral icterus.   Abdominal: Soft. Bowel sounds are normal. A hernia is present.   Right inguinal hernia   Neurological: He is alert and oriented to person, place, and time.   Nursing note and vitals reviewed.    Reviewed Data:  No visits with results within 1 Month(s) from this visit.   Latest known visit with results is:   Office Visit on 12/11/2017   Component Date Value Ref Range Status   • Total Cholesterol 12/15/2017 174  0 - 200 mg/dL Final   • Triglycerides 12/15/2017 237* 0 - 150 mg/dL Final   • HDL Cholesterol 12/15/2017 37* 40 - 60 mg/dL Final   • VLDL Cholesterol 12/15/2017 47.4* 5 - 40 mg/dL Final   • LDL Cholesterol  12/15/2017 90  0 - 100 mg/dL Final   • Glucose 12/15/2017 95  65 - 99 mg/dL Final   • BUN 12/15/2017 14  6 - 20 mg/dL Final   • Creatinine 12/15/2017 1.13  0.76 - 1.27 mg/dL Final   • eGFR Non  Am 12/15/2017 72  >60 mL/min/1.73 Final   • eGFR African Am 12/15/2017 88  >60 mL/min/1.73 Final   • BUN/Creatinine Ratio 12/15/2017 12.4  7.0 - 25.0 Final   • Sodium 12/15/2017 142  136 - 145 mmol/L Final   • Potassium 12/15/2017 4.5  3.5 - 5.2 mmol/L Final   • Chloride 12/15/2017 102  98 - 107 mmol/L Final   • Total CO2 12/15/2017 26.3  22.0 - 29.0 mmol/L Final   • Calcium 12/15/2017 8.9  8.6 - 10.5 mg/dL Final   • Total Protein 12/15/2017 7.2  6.0 - 8.5 g/dL Final   • Albumin 12/15/2017 4.40  3.50 - 5.20 g/dL Final   • Globulin 12/15/2017 2.8  gm/dL Final   • A/G Ratio 12/15/2017 1.6  g/dL Final   • Total Bilirubin 12/15/2017 0.2  0.1 - 1.2 mg/dL Final   • Alkaline Phosphatase 12/15/2017 80  39 - 117 U/L Final   • AST (SGOT) 12/15/2017 67* 1 - 40 U/L Final   • ALT (SGPT) 12/15/2017 68* 1 - 41 U/L Final   • Hemoglobin A1C 12/15/2017 5.37  4.80 - 5.60 % Final    Comment: Hemoglobin A1C Ranges:  Increased Risk for Diabetes  5.7% to 6.4%  Diabetes                     >= 6.5%  Diabetic Goal                 < 7.0%     • TSH 12/15/2017 1.300  0.270 - 4.200 mIU/mL Final

## 2019-04-19 ENCOUNTER — OFFICE (AMBULATORY)
Dept: URBAN - METROPOLITAN AREA CLINIC 75 | Facility: CLINIC | Age: 39
End: 2019-04-19
Payer: COMMERCIAL

## 2019-04-19 VITALS
HEIGHT: 74 IN | SYSTOLIC BLOOD PRESSURE: 148 MMHG | DIASTOLIC BLOOD PRESSURE: 90 MMHG | WEIGHT: 221 LBS | RESPIRATION RATE: 15 BRPM | HEART RATE: 57 BPM

## 2019-04-19 DIAGNOSIS — R10.32 LEFT LOWER QUADRANT PAIN: ICD-10-CM

## 2019-04-19 DIAGNOSIS — R94.5 ABNORMAL RESULTS OF LIVER FUNCTION STUDIES: ICD-10-CM

## 2019-04-19 DIAGNOSIS — K22.70 BARRETT'S ESOPHAGUS WITHOUT DYSPLASIA: ICD-10-CM

## 2019-04-19 DIAGNOSIS — Z80.3 FAMILY HISTORY OF MALIGNANT NEOPLASM OF BREAST: ICD-10-CM

## 2019-04-19 PROCEDURE — 99214 OFFICE O/P EST MOD 30 MIN: CPT

## 2019-05-02 ENCOUNTER — TRANSCRIBE ORDERS (OUTPATIENT)
Dept: ADMINISTRATIVE | Facility: HOSPITAL | Age: 39
End: 2019-05-02

## 2019-05-02 DIAGNOSIS — R79.89 ELEVATED LFTS: Primary | ICD-10-CM

## 2019-05-02 DIAGNOSIS — R10.32 ABDOMINAL PAIN, LEFT LOWER QUADRANT: ICD-10-CM

## 2019-05-10 ENCOUNTER — HOSPITAL ENCOUNTER (OUTPATIENT)
Dept: CT IMAGING | Facility: HOSPITAL | Age: 39
Discharge: HOME | End: 2019-05-10
Admitting: INTERNAL MEDICINE

## 2019-05-10 DIAGNOSIS — R10.32 ABDOMINAL PAIN, LEFT LOWER QUADRANT: ICD-10-CM

## 2019-05-10 DIAGNOSIS — R79.89 ELEVATED LFTS: ICD-10-CM

## 2019-05-10 PROCEDURE — 25010000002 IOPAMIDOL 61 % SOLUTION: Performed by: INTERNAL MEDICINE

## 2019-05-10 PROCEDURE — 74177 CT ABD & PELVIS W/CONTRAST: CPT

## 2019-05-10 PROCEDURE — 0 DIATRIZOATE MEGLUMINE & SODIUM PER 1 ML: Performed by: INTERNAL MEDICINE

## 2019-05-10 RX ADMIN — IOPAMIDOL 85 ML: 612 INJECTION, SOLUTION INTRAVENOUS at 11:06

## 2019-05-10 RX ADMIN — DIATRIZOATE MEGLUMINE AND DIATRIZOATE SODIUM 30 ML: 660; 100 LIQUID ORAL; RECTAL at 11:06

## 2019-05-22 ENCOUNTER — TELEPHONE (OUTPATIENT)
Dept: INTERNAL MEDICINE | Facility: CLINIC | Age: 39
End: 2019-05-22

## 2019-05-22 NOTE — TELEPHONE ENCOUNTER
LMA - patient notified.  Appointment scheduled for tomorrow.  Patient asked to call the office to reschedule appointment if needed.

## 2019-05-22 NOTE — TELEPHONE ENCOUNTER
----- Message from Dov Blanco MD sent at 5/21/2019  1:13 PM EDT -----  Follow-up appointment UA and discuss bladder abnormality on CT scan

## 2019-05-23 ENCOUNTER — OFFICE VISIT (OUTPATIENT)
Dept: INTERNAL MEDICINE | Facility: CLINIC | Age: 39
End: 2019-05-23

## 2019-05-23 VITALS
OXYGEN SATURATION: 100 % | TEMPERATURE: 97.6 F | WEIGHT: 225.2 LBS | HEART RATE: 71 BPM | SYSTOLIC BLOOD PRESSURE: 124 MMHG | DIASTOLIC BLOOD PRESSURE: 82 MMHG | BODY MASS INDEX: 27.41 KG/M2

## 2019-05-23 DIAGNOSIS — N32.9 LESION OF BLADDER: Primary | ICD-10-CM

## 2019-05-23 LAB
BILIRUB BLD-MCNC: NEGATIVE MG/DL
CLARITY, POC: CLEAR
COLOR UR: YELLOW
GLUCOSE UR STRIP-MCNC: NEGATIVE MG/DL
KETONES UR QL: NEGATIVE
LEUKOCYTE EST, POC: NEGATIVE
NITRITE UR-MCNC: NEGATIVE MG/ML
PH UR: 6 [PH] (ref 5–8)
PROT UR STRIP-MCNC: NEGATIVE MG/DL
RBC # UR STRIP: NEGATIVE /UL
SP GR UR: 1.02 (ref 1–1.03)
UROBILINOGEN UR QL: NORMAL

## 2019-05-23 PROCEDURE — 81003 URINALYSIS AUTO W/O SCOPE: CPT | Performed by: FAMILY MEDICINE

## 2019-05-23 PROCEDURE — 99213 OFFICE O/P EST LOW 20 MIN: CPT | Performed by: FAMILY MEDICINE

## 2019-05-23 RX ORDER — LANSOPRAZOLE 30 MG/1
30 CAPSULE, DELAYED RELEASE ORAL DAILY
COMMUNITY

## 2019-05-23 NOTE — PROGRESS NOTES
Jimi Villar is a 38 y.o. male.      Assessment/Plan   Problem List Items Addressed This Visit        Genitourinary    Lesion of bladder - Primary    Relevant Orders    Ambulatory Referral to Urology         Patient has had previous vasectomy with Dr. Molina    Return if symptoms worsen or fail to improve, for Recheck.      Chief Complaint   Patient presents with   • discuss CT results     Social History     Tobacco Use   • Smoking status: Never Smoker   • Smokeless tobacco: Never Used   Substance Use Topics   • Alcohol use: Yes     Comment: 2-3 per day   • Drug use: No       History of Present Illness   Patient follow-up for incidental finding of new problem bladder lesion found on CT scan during routine GI evaluation patient has not had any urinary symptoms other than some vague pelvic pain thought to be hernia or diverticulosis CT scan reveals some letter wall thickening he does not specifically have any urinary symptoms UA today is unremarkable  The following portions of the patient's history were reviewed and updated as appropriate:PMHroutine: Social history , Allergies, Current Medications, Active Problem List and Health Maintenance    Review of Systems   Constitutional: Negative.    Cardiovascular: Negative.    Genitourinary: Negative.    Musculoskeletal: Negative.        Objective   Vitals:    05/23/19 0751   BP: 124/82   BP Location: Left arm   Patient Position: Sitting   Cuff Size: Adult   Pulse: 71   Temp: 97.6 °F (36.4 °C)   TempSrc: Oral   SpO2: 100%   Weight: 102 kg (225 lb 3.2 oz)     Body mass index is 27.41 kg/m².  Physical Exam   Constitutional: He appears well-developed and well-nourished.   HENT:   Head: Normocephalic and atraumatic.   Cardiovascular: Normal rate.   Pulmonary/Chest: Effort normal.   Psychiatric: He has a normal mood and affect. His behavior is normal. Judgment and thought content normal.   Nursing note and vitals reviewed.    Reviewed Data:  No visits with results within 1  Month(s) from this visit.   Latest known visit with results is:   Office Visit on 12/11/2017   Component Date Value Ref Range Status   • Total Cholesterol 12/15/2017 174  0 - 200 mg/dL Final   • Triglycerides 12/15/2017 237* 0 - 150 mg/dL Final   • HDL Cholesterol 12/15/2017 37* 40 - 60 mg/dL Final   • VLDL Cholesterol 12/15/2017 47.4* 5 - 40 mg/dL Final   • LDL Cholesterol  12/15/2017 90  0 - 100 mg/dL Final   • Glucose 12/15/2017 95  65 - 99 mg/dL Final   • BUN 12/15/2017 14  6 - 20 mg/dL Final   • Creatinine 12/15/2017 1.13  0.76 - 1.27 mg/dL Final   • eGFR Non  Am 12/15/2017 72  >60 mL/min/1.73 Final   • eGFR African Am 12/15/2017 88  >60 mL/min/1.73 Final   • BUN/Creatinine Ratio 12/15/2017 12.4  7.0 - 25.0 Final   • Sodium 12/15/2017 142  136 - 145 mmol/L Final   • Potassium 12/15/2017 4.5  3.5 - 5.2 mmol/L Final   • Chloride 12/15/2017 102  98 - 107 mmol/L Final   • Total CO2 12/15/2017 26.3  22.0 - 29.0 mmol/L Final   • Calcium 12/15/2017 8.9  8.6 - 10.5 mg/dL Final   • Total Protein 12/15/2017 7.2  6.0 - 8.5 g/dL Final   • Albumin 12/15/2017 4.40  3.50 - 5.20 g/dL Final   • Globulin 12/15/2017 2.8  gm/dL Final   • A/G Ratio 12/15/2017 1.6  g/dL Final   • Total Bilirubin 12/15/2017 0.2  0.1 - 1.2 mg/dL Final   • Alkaline Phosphatase 12/15/2017 80  39 - 117 U/L Final   • AST (SGOT) 12/15/2017 67* 1 - 40 U/L Final   • ALT (SGPT) 12/15/2017 68* 1 - 41 U/L Final   • Hemoglobin A1C 12/15/2017 5.37  4.80 - 5.60 % Final    Comment: Hemoglobin A1C Ranges:  Increased Risk for Diabetes  5.7% to 6.4%  Diabetes                     >= 6.5%  Diabetic Goal                < 7.0%     • TSH 12/15/2017 1.300  0.270 - 4.200 mIU/mL Final

## 2020-02-24 ENCOUNTER — TELEPHONE (OUTPATIENT)
Dept: INTERNAL MEDICINE | Facility: CLINIC | Age: 40
End: 2020-02-24

## 2020-02-25 ENCOUNTER — OFFICE VISIT (OUTPATIENT)
Dept: INTERNAL MEDICINE | Facility: CLINIC | Age: 40
End: 2020-02-25

## 2020-02-25 VITALS
OXYGEN SATURATION: 99 % | TEMPERATURE: 98.5 F | HEIGHT: 76 IN | HEART RATE: 64 BPM | WEIGHT: 229.9 LBS | DIASTOLIC BLOOD PRESSURE: 80 MMHG | SYSTOLIC BLOOD PRESSURE: 110 MMHG | BODY MASS INDEX: 28 KG/M2

## 2020-02-25 DIAGNOSIS — Z00.00 HEALTH CARE MAINTENANCE: Primary | ICD-10-CM

## 2020-02-25 PROBLEM — R05.9 COUGH: Status: RESOLVED | Noted: 2018-01-23 | Resolved: 2020-02-25

## 2020-02-25 PROBLEM — J02.9 SORE THROAT: Status: RESOLVED | Noted: 2018-07-18 | Resolved: 2020-02-25

## 2020-02-25 LAB
ALBUMIN SERPL-MCNC: 4.6 G/DL (ref 3.5–5.2)
ALBUMIN/GLOB SERPL: 1.7 G/DL
ALP SERPL-CCNC: 79 U/L (ref 39–117)
ALT SERPL-CCNC: 46 U/L (ref 1–41)
AST SERPL-CCNC: 38 U/L (ref 1–40)
BASOPHILS # BLD AUTO: 0.05 10*3/MM3 (ref 0–0.2)
BASOPHILS NFR BLD AUTO: 1.1 % (ref 0–1.5)
BILIRUB SERPL-MCNC: 0.5 MG/DL (ref 0.2–1.2)
BUN SERPL-MCNC: 11 MG/DL (ref 6–20)
BUN/CREAT SERPL: 9.6 (ref 7–25)
CALCIUM SERPL-MCNC: 9.3 MG/DL (ref 8.6–10.5)
CHLORIDE SERPL-SCNC: 100 MMOL/L (ref 98–107)
CHOLEST SERPL-MCNC: 184 MG/DL (ref 0–200)
CO2 SERPL-SCNC: 27.4 MMOL/L (ref 22–29)
CREAT SERPL-MCNC: 1.15 MG/DL (ref 0.76–1.27)
EOSINOPHIL # BLD AUTO: 0.07 10*3/MM3 (ref 0–0.4)
EOSINOPHIL NFR BLD AUTO: 1.5 % (ref 0.3–6.2)
ERYTHROCYTE [DISTWIDTH] IN BLOOD BY AUTOMATED COUNT: 12.4 % (ref 12.3–15.4)
GLOBULIN SER CALC-MCNC: 2.7 GM/DL
GLUCOSE SERPL-MCNC: 95 MG/DL (ref 65–99)
HCT VFR BLD AUTO: 42.6 % (ref 37.5–51)
HDLC SERPL-MCNC: 40 MG/DL (ref 40–60)
HGB BLD-MCNC: 14.9 G/DL (ref 13–17.7)
IMM GRANULOCYTES # BLD AUTO: 0.01 10*3/MM3 (ref 0–0.05)
IMM GRANULOCYTES NFR BLD AUTO: 0.2 % (ref 0–0.5)
LDLC SERPL CALC-MCNC: 112 MG/DL (ref 0–100)
LYMPHOCYTES # BLD AUTO: 2 10*3/MM3 (ref 0.7–3.1)
LYMPHOCYTES NFR BLD AUTO: 42.6 % (ref 19.6–45.3)
MCH RBC QN AUTO: 32 PG (ref 26.6–33)
MCHC RBC AUTO-ENTMCNC: 35 G/DL (ref 31.5–35.7)
MCV RBC AUTO: 91.6 FL (ref 79–97)
MONOCYTES # BLD AUTO: 0.39 10*3/MM3 (ref 0.1–0.9)
MONOCYTES NFR BLD AUTO: 8.3 % (ref 5–12)
NEUTROPHILS # BLD AUTO: 2.17 10*3/MM3 (ref 1.7–7)
NEUTROPHILS NFR BLD AUTO: 46.3 % (ref 42.7–76)
NRBC BLD AUTO-RTO: 0 /100 WBC (ref 0–0.2)
PLATELET # BLD AUTO: 234 10*3/MM3 (ref 140–450)
POTASSIUM SERPL-SCNC: 4.7 MMOL/L (ref 3.5–5.2)
PROT SERPL-MCNC: 7.3 G/DL (ref 6–8.5)
RBC # BLD AUTO: 4.65 10*6/MM3 (ref 4.14–5.8)
SODIUM SERPL-SCNC: 140 MMOL/L (ref 136–145)
TRIGL SERPL-MCNC: 161 MG/DL (ref 0–150)
VLDLC SERPL CALC-MCNC: 32.2 MG/DL
WBC # BLD AUTO: 4.69 10*3/MM3 (ref 3.4–10.8)

## 2020-02-25 PROCEDURE — 99396 PREV VISIT EST AGE 40-64: CPT | Performed by: FAMILY MEDICINE

## 2020-02-25 RX ORDER — KETOCONAZOLE 20 MG/ML
SHAMPOO TOPICAL
COMMUNITY
Start: 2019-12-17 | End: 2021-03-24

## 2020-02-25 NOTE — PROGRESS NOTES
Subjective   Jimi Villar is a 39 y.o. male.     Chief Complaint   Patient presents with   • Annual Exam         History of Present Illness   Jimi Villar 39 y.o. male who presents for an Annual Wellness Visit.  he has a history of   Patient Active Problem List   Diagnosis   • Asthma   • Anxiety   • Gastroesophageal reflux disease with esophagitis   • Health care maintenance   • Overweight   • Musculoskeletal pain   • Non-recurrent unilateral inguinal hernia without obstruction or gangrene   • Lesion of bladder   .  he has been feeling well.  Labs results discussed in detail with the patient.  Plan to update vaccines if needed today.  I  reviewed health maintenance with him as part of my preventative care plan.    Health Habits:  Dental Exam. up to date  Eye Exam. unknown  Exercise: 4 times/week.  Current exercise activities include: aerobics and weightlifting      The following portions of the patient's history were reviewed and updated as appropriate: allergies, current medications, past family history, past medical history, past social history, past surgical history and problem list.    Review of Systems   Constitutional: Negative for appetite change, fever and unexpected weight change.   HENT: Negative for ear pain, facial swelling and sore throat.    Eyes: Negative for pain and visual disturbance.   Respiratory: Negative for chest tightness, shortness of breath and wheezing.    Cardiovascular: Negative for chest pain and palpitations.   Gastrointestinal: Negative for abdominal pain and blood in stool.   Endocrine: Negative.    Genitourinary: Negative for difficulty urinating and hematuria.   Musculoskeletal: Negative for joint swelling.   Neurological: Negative for tremors, seizures and syncope.   Hematological: Negative for adenopathy.   Psychiatric/Behavioral: Negative.        Objective   Physical Exam   Constitutional: He is oriented to person, place, and time. He appears well-developed and  well-nourished.   HENT:   Head: Normocephalic and atraumatic.   Right Ear: External ear normal.   Left Ear: External ear normal.   Mouth/Throat: Oropharynx is clear and moist.   Eyes: Conjunctivae are normal. No scleral icterus.   Neck: Normal range of motion. No thyromegaly present.   Cardiovascular: Normal rate and regular rhythm.   Pulmonary/Chest: Effort normal and breath sounds normal.   Abdominal: There is no tenderness.   Musculoskeletal: He exhibits no edema or tenderness.   Lymphadenopathy:     He has no cervical adenopathy.   Neurological: He is alert and oriented to person, place, and time.   Skin: Skin is warm and dry.   Psychiatric: He has a normal mood and affect. His behavior is normal. Judgment and thought content normal.   Nursing note and vitals reviewed.      Assessment/Plan   Jimi was seen today for annual exam.    Diagnoses and all orders for this visit:    Health care maintenance  -     Lipid Panel  -     Comprehensive Metabolic Panel  -     CBC & Differential      Patient declines influenza vaccine he had Tdap proximally 5 years ago when he had injury to his wrist laceration  He has developed a low weight loss program with some intermittent fasting feels very well with it is can continue that with healthy lifestyle of regular exercise follow-up otherwise as needed or annually.  Continue routine checks with GI and psychiatry

## 2021-03-16 ENCOUNTER — TELEPHONE (OUTPATIENT)
Dept: INTERNAL MEDICINE | Facility: CLINIC | Age: 41
End: 2021-03-16

## 2021-03-16 NOTE — TELEPHONE ENCOUNTER
He does not take any routine medicine for routine labs, I prefer to order the labs based on our visit discussion and findings.  Recommend fasting at the appointment do not eat after 10:00 pm  the day before

## 2021-03-16 NOTE — TELEPHONE ENCOUNTER
PT CALLED TO REQUEST ORDERS FOR ANNUAL LABS SO HE CAN COME IN FOR LABS BEFORE SCHEDULING HIS ANNUAL PHYSICAL.    PLEASE ORDER LABS AND CALL PT BACK TO CONFIRM.     HE CAN BE REACHED -[phone]

## 2021-03-16 NOTE — TELEPHONE ENCOUNTER
NO APPOINTMENT SCHEDULED.  This is a 52 y/o ,unemployed on SSI,with h/o Depression/Anxiety ,Alcohol dependence was admitted with alcohol withdrawal.  Patient was interviewed,chart was reviewed,case was discussed with MD.  Patient stated:"I cant stop drinking,I need help".  Patient has a private psychiatrist ant therapist .He was taking Neurontin and Latuda at home.  He is c/o tremor,anxiety and poor night sleep.  Speech is logical ,goal directed.  Denied s/h thought, denied  a/v hallucinations.  Memory and cognition-fair.I&J-fair. Patient wants to go to inpatient alcohol Rehab [facility]

## 2021-03-24 ENCOUNTER — LAB (OUTPATIENT)
Dept: LAB | Facility: HOSPITAL | Age: 41
End: 2021-03-24

## 2021-03-24 ENCOUNTER — OFFICE VISIT (OUTPATIENT)
Dept: INTERNAL MEDICINE | Facility: CLINIC | Age: 41
End: 2021-03-24

## 2021-03-24 VITALS
OXYGEN SATURATION: 99 % | WEIGHT: 236.5 LBS | HEIGHT: 74 IN | DIASTOLIC BLOOD PRESSURE: 72 MMHG | HEART RATE: 60 BPM | SYSTOLIC BLOOD PRESSURE: 112 MMHG | BODY MASS INDEX: 30.35 KG/M2

## 2021-03-24 DIAGNOSIS — Z00.00 HEALTH CARE MAINTENANCE: Primary | ICD-10-CM

## 2021-03-24 DIAGNOSIS — F41.9 ANXIETY: ICD-10-CM

## 2021-03-24 DIAGNOSIS — E66.3 OVERWEIGHT: ICD-10-CM

## 2021-03-24 DIAGNOSIS — K21.00 GASTROESOPHAGEAL REFLUX DISEASE WITH ESOPHAGITIS WITHOUT HEMORRHAGE: ICD-10-CM

## 2021-03-24 LAB
ALBUMIN SERPL-MCNC: 4.8 G/DL (ref 3.5–5.2)
ALBUMIN/GLOB SERPL: 1.9 G/DL
ALP SERPL-CCNC: 84 U/L (ref 39–117)
ALT SERPL W P-5'-P-CCNC: 74 U/L (ref 1–41)
ANION GAP SERPL CALCULATED.3IONS-SCNC: 10 MMOL/L (ref 5–15)
AST SERPL-CCNC: 34 U/L (ref 1–40)
BILIRUB SERPL-MCNC: 0.4 MG/DL (ref 0–1.2)
BUN SERPL-MCNC: 12 MG/DL (ref 6–20)
BUN/CREAT SERPL: 11 (ref 7–25)
CALCIUM SPEC-SCNC: 9.2 MG/DL (ref 8.6–10.5)
CHLORIDE SERPL-SCNC: 100 MMOL/L (ref 98–107)
CHOLEST SERPL-MCNC: 200 MG/DL (ref 0–200)
CO2 SERPL-SCNC: 29 MMOL/L (ref 22–29)
CREAT SERPL-MCNC: 1.09 MG/DL (ref 0.76–1.27)
GFR SERPL CREATININE-BSD FRML MDRD: 74 ML/MIN/1.73
GLOBULIN UR ELPH-MCNC: 2.5 GM/DL
GLUCOSE SERPL-MCNC: 104 MG/DL (ref 65–99)
HCV AB SER DONR QL: NORMAL
HDLC SERPL-MCNC: 37 MG/DL (ref 40–60)
LDLC SERPL CALC-MCNC: 111 MG/DL (ref 0–100)
LDLC/HDLC SERPL: 2.79 {RATIO}
POTASSIUM SERPL-SCNC: 4.3 MMOL/L (ref 3.5–5.2)
PROT SERPL-MCNC: 7.3 G/DL (ref 6–8.5)
SODIUM SERPL-SCNC: 139 MMOL/L (ref 136–145)
TRIGL SERPL-MCNC: 298 MG/DL (ref 0–150)
VLDLC SERPL-MCNC: 52 MG/DL (ref 5–40)

## 2021-03-24 PROCEDURE — 86803 HEPATITIS C AB TEST: CPT | Performed by: FAMILY MEDICINE

## 2021-03-24 PROCEDURE — 80053 COMPREHEN METABOLIC PANEL: CPT | Performed by: FAMILY MEDICINE

## 2021-03-24 PROCEDURE — 36415 COLL VENOUS BLD VENIPUNCTURE: CPT | Performed by: FAMILY MEDICINE

## 2021-03-24 PROCEDURE — 80061 LIPID PANEL: CPT | Performed by: FAMILY MEDICINE

## 2021-03-24 PROCEDURE — 99396 PREV VISIT EST AGE 40-64: CPT | Performed by: FAMILY MEDICINE

## 2021-03-24 RX ORDER — ESCITALOPRAM OXALATE 20 MG/1
20 TABLET ORAL DAILY
COMMUNITY

## 2021-03-24 NOTE — PROGRESS NOTES
Subjective   Jimi Villar is a 40 y.o. male.     Chief Complaint   Patient presents with   • Annual Exam         History of Present Illness   Jimi Villar 40 y.o. male who presents for an Annual Wellness Visit.  he has a history of   Patient Active Problem List   Diagnosis   • Asthma   • Anxiety   • Gastroesophageal reflux disease with esophagitis   • Health care maintenance   • Overweight   • Musculoskeletal pain   • Non-recurrent unilateral inguinal hernia without obstruction or gangrene   • Lesion of bladder   .  he has been feeling well.  Labs results discussed in detail with the patient.  Plan to update vaccines if needed today.  I  reviewed health maintenance with him as part of my preventative care plan.    Health Habits:  Dental Exam. up to date  Eye Exam. up to date  Exercise: 5 times/week.  Current exercise activities include: walking      The following portions of the patient's history were reviewed and updated as appropriate: allergies, current medications, past family history, past medical history, past social history, past surgical history and problem list.    Review of Systems   Constitutional: Negative.    Respiratory: Negative.    Cardiovascular: Negative.    Gastrointestinal: Negative.    Genitourinary: Negative.    Musculoskeletal: Negative.    Allergic/Immunologic: Positive for environmental allergies.   Neurological: Negative.    Hematological: Negative.    Psychiatric/Behavioral: The patient is nervous/anxious.        Objective   Physical Exam  Vitals and nursing note reviewed.   Constitutional:       Appearance: Normal appearance. He is well-developed. He is not diaphoretic.   HENT:      Head: Normocephalic and atraumatic.      Right Ear: Tympanic membrane, ear canal and external ear normal.      Left Ear: Tympanic membrane, ear canal and external ear normal.   Eyes:      General: Lids are normal. No scleral icterus.     Extraocular Movements: Extraocular movements intact.       Conjunctiva/sclera: Conjunctivae normal.   Neck:      Thyroid: No thyroid mass or thyromegaly.      Vascular: No carotid bruit or JVD.   Cardiovascular:      Rate and Rhythm: Normal rate and regular rhythm.      Pulses: Normal pulses.           Radial pulses are 2+ on the right side and 2+ on the left side.      Heart sounds: Normal heart sounds. No murmur heard.     Pulmonary:      Effort: Pulmonary effort is normal. No respiratory distress.      Breath sounds: Normal breath sounds.   Abdominal:      Palpations: Abdomen is soft.   Musculoskeletal:      Cervical back: Normal range of motion.      Right lower leg: No edema.      Left lower leg: No edema.   Skin:     General: Skin is warm and dry.      Coloration: Skin is not pale.      Findings: No erythema or rash.   Neurological:      General: No focal deficit present.      Mental Status: He is alert and oriented to person, place, and time.      Sensory: No sensory deficit.      Deep Tendon Reflexes: Reflexes are normal and symmetric.   Psychiatric:         Mood and Affect: Mood normal.         Behavior: Behavior normal. Behavior is cooperative.         Thought Content: Thought content normal.         Judgment: Judgment normal.         Assessment/Plan   Diagnoses and all orders for this visit:    1. Health care maintenance (Primary)  -     Comprehensive Metabolic Panel  -     Lipid Panel  -     Hepatitis C Antibody  -     Pneumococcal Polysaccharide Vaccine 23-Valent Greater Than or Equal To 1yo Subcutaneous / IM; Future    2. Gastroesophageal reflux disease with esophagitis without hemorrhage    3. Overweight    4. Anxiety    Anxiety continue regular consultation with therapist  GERD has pending follow-up with GI monitoring [facility]  Continue healthy lifestyle calorie appropriate diet regular physical activity  Patient will receive Pneumovax 23 in June  Follow-up results of blood work otherwise as needed or annually  Discussed cancer screening

## 2021-04-02 DIAGNOSIS — R74.01 ELEVATED ALT MEASUREMENT: Primary | ICD-10-CM

## 2021-04-06 ENCOUNTER — TELEPHONE (OUTPATIENT)
Dept: INTERNAL MEDICINE | Facility: CLINIC | Age: 41
End: 2021-04-06

## 2021-04-06 NOTE — TELEPHONE ENCOUNTER
Caller: Jimi Villar    Relationship: Self    Best call back number: [phone]    What form or medical record are you requesting: HEALTHY ME INCENTIVE FROM [facility]    Who is requesting this form or medical record from you: INSURANCE    How would you like to receive the form or medical records (pick-up, mail, fax):PICK-UP      Timeframe paperwork needed: ASAP    Additional notes: PATIENT STATES THAT HE WILL PICK IT UP WHEN IT IS READY.  PLEASE CALL AND LET PATIENT KNOW WHEN READY.

## 2021-04-26 VITALS
DIASTOLIC BLOOD PRESSURE: 75 MMHG | WEIGHT: 235 LBS | SYSTOLIC BLOOD PRESSURE: 115 MMHG | TEMPERATURE: 97.3 F | HEIGHT: 74 IN

## 2021-04-27 ENCOUNTER — OFFICE (AMBULATORY)
Dept: URBAN - METROPOLITAN AREA CLINIC 75 | Facility: CLINIC | Age: 41
End: 2021-04-27

## 2021-04-27 DIAGNOSIS — R12 HEARTBURN: ICD-10-CM

## 2021-04-27 DIAGNOSIS — R13.10 DYSPHAGIA, UNSPECIFIED: ICD-10-CM

## 2021-04-27 DIAGNOSIS — K22.70 BARRETT'S ESOPHAGUS WITHOUT DYSPLASIA: ICD-10-CM

## 2021-04-27 PROCEDURE — 99214 OFFICE O/P EST MOD 30 MIN: CPT | Performed by: INTERNAL MEDICINE

## 2021-08-17 VITALS
TEMPERATURE: 97.1 F | OXYGEN SATURATION: 97 % | OXYGEN SATURATION: 98 % | HEART RATE: 79 BPM | DIASTOLIC BLOOD PRESSURE: 83 MMHG | SYSTOLIC BLOOD PRESSURE: 114 MMHG | WEIGHT: 235 LBS | OXYGEN SATURATION: 96 % | OXYGEN SATURATION: 94 % | SYSTOLIC BLOOD PRESSURE: 106 MMHG | DIASTOLIC BLOOD PRESSURE: 81 MMHG | SYSTOLIC BLOOD PRESSURE: 126 MMHG | DIASTOLIC BLOOD PRESSURE: 69 MMHG | HEART RATE: 69 BPM | DIASTOLIC BLOOD PRESSURE: 61 MMHG | SYSTOLIC BLOOD PRESSURE: 130 MMHG | HEART RATE: 63 BPM | SYSTOLIC BLOOD PRESSURE: 145 MMHG | HEART RATE: 68 BPM | RESPIRATION RATE: 18 BRPM | HEIGHT: 74 IN | DIASTOLIC BLOOD PRESSURE: 75 MMHG | TEMPERATURE: 97.2 F | RESPIRATION RATE: 8 BRPM

## 2021-08-19 ENCOUNTER — AMBULATORY SURGICAL CENTER (AMBULATORY)
Dept: URBAN - METROPOLITAN AREA SURGERY 17 | Facility: SURGERY | Age: 41
End: 2021-08-19

## 2021-08-19 ENCOUNTER — OFFICE (AMBULATORY)
Dept: URBAN - METROPOLITAN AREA PATHOLOGY 4 | Facility: PATHOLOGY | Age: 41
End: 2021-08-19

## 2021-08-19 DIAGNOSIS — K44.9 DIAPHRAGMATIC HERNIA WITHOUT OBSTRUCTION OR GANGRENE: ICD-10-CM

## 2021-08-19 DIAGNOSIS — K22.2 ESOPHAGEAL OBSTRUCTION: ICD-10-CM

## 2021-08-19 DIAGNOSIS — K22.70 BARRETT'S ESOPHAGUS WITHOUT DYSPLASIA: ICD-10-CM

## 2021-08-19 LAB
GI HISTOLOGY: A. UNSPECIFIED: (no result)
GI HISTOLOGY: PDF REPORT: (no result)

## 2021-08-19 PROCEDURE — 43239 EGD BIOPSY SINGLE/MULTIPLE: CPT | Performed by: INTERNAL MEDICINE

## 2021-08-19 PROCEDURE — 88305 TISSUE EXAM BY PATHOLOGIST: CPT | Performed by: INTERNAL MEDICINE

## 2022-03-03 ENCOUNTER — OFFICE VISIT (OUTPATIENT)
Dept: INTERNAL MEDICINE | Facility: CLINIC | Age: 42
End: 2022-03-03

## 2022-03-03 VITALS
BODY MASS INDEX: 31.03 KG/M2 | TEMPERATURE: 97.5 F | WEIGHT: 241.8 LBS | DIASTOLIC BLOOD PRESSURE: 78 MMHG | OXYGEN SATURATION: 98 % | HEIGHT: 74 IN | HEART RATE: 84 BPM | SYSTOLIC BLOOD PRESSURE: 134 MMHG

## 2022-03-03 DIAGNOSIS — R30.0 DYSURIA: Primary | ICD-10-CM

## 2022-03-03 PROCEDURE — 99213 OFFICE O/P EST LOW 20 MIN: CPT | Performed by: FAMILY MEDICINE

## 2022-03-03 RX ORDER — CIPROFLOXACIN 250 MG/1
250 TABLET, FILM COATED ORAL 2 TIMES DAILY
Qty: 10 TABLET | Refills: 0 | Status: SHIPPED | OUTPATIENT
Start: 2022-03-03

## 2022-03-03 NOTE — PROGRESS NOTES
"Chief Complaint  urinary burning (since Sunday)    Subjective          Jimi Villar presents to [facility]  History of Present Illness  Patient with 5-day history of dysuria through the urethra  No specific trauma no back pain no pelvic pain no decreased urine distant history of cystoscopy seem to improve some urethral pain in the past  No fever sweats or chills no known exposures  Objective   Vital Signs:   /78 (BP Location: Right arm, Patient Position: Sitting, Cuff Size: Large Adult)   Pulse 84   Temp 97.5 °F (36.4 °C) (Infrared)   Ht 188 cm (74\")   Wt 110 kg (241 lb 12.8 oz)   SpO2 98%   BMI 31.05 kg/m²     Physical Exam  Vitals reviewed.   Constitutional:       Appearance: Normal appearance.   Genitourinary:     Penis: Normal.       Testes: Normal.   Neurological:      Mental Status: He is alert.        Result Review :       UA normal            Assessment and Plan    Diagnoses and all orders for this visit:    1. Dysuria (Primary)    Other orders  -     ciprofloxacin (Cipro) 250 MG tablet; Take 1 tablet by mouth 2 (Two) Times a Day.  Dispense: 10 tablet; Refill: 0    Patient declines STI testing    Follow Up   Return if symptoms worsen or fail to improve, for If recurrence consult urology.  Patient was given instructions and counseling regarding his condition or for health maintenance advice. Please see specific information pulled into the AVS if appropriate.       "

## 2022-03-08 ENCOUNTER — TELEPHONE (OUTPATIENT)
Dept: INTERNAL MEDICINE | Facility: CLINIC | Age: 42
End: 2022-03-08

## 2022-03-08 RX ORDER — PHENAZOPYRIDINE HYDROCHLORIDE 100 MG/1
100 TABLET, FILM COATED ORAL 3 TIMES DAILY PRN
Qty: 15 TABLET | Refills: 0 | Status: SHIPPED | OUTPATIENT
Start: 2022-03-08

## 2022-03-08 NOTE — TELEPHONE ENCOUNTER
Prescription for Pyridium sent to pharmacy 1 every 8 hours this will also changes urine to orange or red perhaps normal follow-up if no improvement with recommendation for consultation with urology

## 2022-03-08 NOTE — TELEPHONE ENCOUNTER
Caller: Jimi Villar    Relationship: Self    Best call back number:[phone]       What is the best time to reach you: ANYTIME     Who are you requesting to speak with (clinical staff, provider,  specific staff member): CLINICAL STAFF       What was the call regarding: PATIENT IS CALLING TO LET DR CREWS KNOW THAT HIS SYMPTOMS HAVE NOT IMPROVED. SYMPTOMS: PAINFUL URINATION, MORE BURNING. PATIENT IS WANTING TO KNOW IF THERE IS ANOTHER MEDICATION HE COULD TAKE, PATIENT FINISHED HIS ANTIBIOTIC THAT DR CREWS PRESCRIBED. PATIENT WOULD LIKE TO KNOW WHAT HIS NEXT STEPS ARE.    PATIENT WOULD ALSO LIKE DR CREWS TO KNOW THAT HE HAS BEEN HAVING COLD SYMPTOMS: COUGH, SNEEZING, SWEATING.     Do you require a callback: YES

## 2023-08-28 NOTE — TELEPHONE ENCOUNTER
Patient notified that we have the 2020 form but not the 2021 form.  He will e-mail this to the office to be filled out.    Detail Level: Detailed Detail Level: Generalized

## 2023-11-07 ENCOUNTER — OFFICE VISIT (OUTPATIENT)
Dept: INTERNAL MEDICINE | Facility: CLINIC | Age: 43
End: 2023-11-07
Payer: COMMERCIAL

## 2023-11-07 VITALS
BODY MASS INDEX: 30.47 KG/M2 | WEIGHT: 237.4 LBS | OXYGEN SATURATION: 99 % | SYSTOLIC BLOOD PRESSURE: 124 MMHG | HEART RATE: 63 BPM | DIASTOLIC BLOOD PRESSURE: 76 MMHG | HEIGHT: 74 IN | TEMPERATURE: 97.3 F

## 2023-11-07 DIAGNOSIS — Z12.11 COLON CANCER SCREENING: ICD-10-CM

## 2023-11-07 DIAGNOSIS — J45.40 MODERATE PERSISTENT ASTHMA WITHOUT COMPLICATION: ICD-10-CM

## 2023-11-07 DIAGNOSIS — E66.3 OVERWEIGHT: ICD-10-CM

## 2023-11-07 DIAGNOSIS — Z23 FLU VACCINE NEED: Primary | ICD-10-CM

## 2023-11-07 DIAGNOSIS — Z00.00 HEALTH CARE MAINTENANCE: ICD-10-CM

## 2023-11-07 DIAGNOSIS — F41.9 ANXIETY: ICD-10-CM

## 2023-11-07 DIAGNOSIS — K21.00 GASTROESOPHAGEAL REFLUX DISEASE WITH ESOPHAGITIS WITHOUT HEMORRHAGE: ICD-10-CM

## 2023-11-07 NOTE — PROGRESS NOTES
Subjective   Jimi Villar is a 43 y.o. male.     Chief Complaint   Patient presents with    Annual Exam         History of Present Illness   Jimi Villar 43 y.o. male who presents for an Annual Wellness Visit.  he has a history of   Patient Active Problem List   Diagnosis    Asthma    Anxiety    Gastroesophageal reflux disease with esophagitis    Health care maintenance    Overweight    Musculoskeletal pain    Non-recurrent unilateral inguinal hernia without obstruction or gangrene    Lesion of bladder    Dysuria    Colon cancer screening   .  he has been feeling well.   I  reviewed health maintenance with him as part of my preventative care plan.  The following portions of the patient's history were reviewed and updated as appropriate: allergies, current medications, past family history, past medical history, past social history, past surgical history, and problem list.    Review of Systems   Constitutional:  Negative for appetite change, fever and unexpected weight change.   HENT:  Negative for ear pain, facial swelling and sore throat.    Eyes:  Negative for pain and visual disturbance.   Respiratory:  Negative for chest tightness, shortness of breath and wheezing.    Cardiovascular:  Negative for chest pain and palpitations.   Gastrointestinal:  Negative for abdominal pain and blood in stool.   Endocrine: Negative.    Genitourinary:  Negative for difficulty urinating and hematuria.   Musculoskeletal:  Negative for joint swelling.   Neurological:  Negative for tremors, seizures and syncope.   Hematological:  Negative for adenopathy.   Psychiatric/Behavioral: Negative.     All other systems reviewed and are negative.      Objective   Physical Exam  Vitals and nursing note reviewed.   Constitutional:       Appearance: Normal appearance. He is well-developed. He is not diaphoretic.   HENT:      Head: Normocephalic and atraumatic.      Right Ear: Tympanic membrane, ear canal and external ear normal.      Left  Ear: Tympanic membrane, ear canal and external ear normal.   Eyes:      General: Lids are normal. No scleral icterus.     Extraocular Movements: Extraocular movements intact.      Conjunctiva/sclera: Conjunctivae normal.   Neck:      Thyroid: No thyroid mass or thyromegaly.      Vascular: No carotid bruit or JVD.   Cardiovascular:      Rate and Rhythm: Normal rate and regular rhythm.      Pulses: Normal pulses.           Radial pulses are 2+ on the right side and 2+ on the left side.      Heart sounds: Normal heart sounds. No murmur heard.  Pulmonary:      Effort: Pulmonary effort is normal. No respiratory distress.      Breath sounds: Normal breath sounds.   Abdominal:      Palpations: Abdomen is soft.      Tenderness: There is no right CVA tenderness or left CVA tenderness.   Musculoskeletal:      Cervical back: Normal range of motion.      Right lower leg: No edema.      Left lower leg: No edema.   Skin:     General: Skin is warm and dry.      Coloration: Skin is not pale.      Findings: No erythema or rash.   Neurological:      General: No focal deficit present.      Mental Status: He is alert and oriented to person, place, and time.      Sensory: No sensory deficit.      Deep Tendon Reflexes: Reflexes are normal and symmetric.   Psychiatric:         Mood and Affect: Mood normal.         Behavior: Behavior normal. Behavior is cooperative.         Thought Content: Thought content normal.         Judgment: Judgment normal.         Assessment & Plan   Diagnoses and all orders for this visit:    1. Flu vaccine need (Primary)  -     Fluzone (or Fluarix & Flulaval for VFC) >6 Mos ([id number])    2. Moderate persistent asthma without complication    3. Anxiety    4. Health care maintenance  -     Comprehensive Metabolic Panel  -     Lipid Panel  -     Hemoglobin A1c    5. Overweight    6. Gastroesophageal reflux disease with esophagitis without hemorrhage  -     Ambulatory Referral to Gastroenterology    7. Colon cancer  screening  -     Ambulatory Referral to Gastroenterology    Other orders  -     Pneumococcal Conjugate Vaccine 20-Valent All      Patient's chronic medical problems stable the follow-up  The attempts at healthy lifestyle calorie appropriate diet and regular physical activity  Education provided regarding prevention of serious illness with immunizations  Education provided regarding early detection screening  Follow-up ongoing management of chronic problems otherwise preventatively annually

## 2023-11-08 ENCOUNTER — TELEPHONE (OUTPATIENT)
Dept: INTERNAL MEDICINE | Facility: CLINIC | Age: 43
End: 2023-11-08

## 2023-11-08 LAB
ALBUMIN SERPL-MCNC: 4.9 G/DL (ref 3.5–5.2)
ALBUMIN/GLOB SERPL: 2 G/DL
ALP SERPL-CCNC: 86 U/L (ref 39–117)
ALT SERPL-CCNC: 52 U/L (ref 1–41)
AST SERPL-CCNC: 30 U/L (ref 1–40)
BILIRUB SERPL-MCNC: 0.7 MG/DL (ref 0–1.2)
BUN SERPL-MCNC: 12 MG/DL (ref 6–20)
BUN/CREAT SERPL: 11.1 (ref 7–25)
CALCIUM SERPL-MCNC: 9.7 MG/DL (ref 8.6–10.5)
CHLORIDE SERPL-SCNC: 101 MMOL/L (ref 98–107)
CHOLEST SERPL-MCNC: 215 MG/DL (ref 0–200)
CO2 SERPL-SCNC: 29 MMOL/L (ref 22–29)
CREAT SERPL-MCNC: 1.08 MG/DL (ref 0.76–1.27)
EGFRCR SERPLBLD CKD-EPI 2021: 86.2 ML/MIN/1.73
GLOBULIN SER CALC-MCNC: 2.4 GM/DL
GLUCOSE SERPL-MCNC: 108 MG/DL (ref 65–99)
HBA1C MFR BLD: 5.6 % (ref 4.8–5.6)
HDLC SERPL-MCNC: 44 MG/DL (ref 40–60)
LDLC SERPL CALC-MCNC: 130 MG/DL (ref 0–100)
POTASSIUM SERPL-SCNC: 4.7 MMOL/L (ref 3.5–5.2)
PROT SERPL-MCNC: 7.3 G/DL (ref 6–8.5)
SODIUM SERPL-SCNC: 140 MMOL/L (ref 136–145)
TRIGL SERPL-MCNC: 231 MG/DL (ref 0–150)
VLDLC SERPL CALC-MCNC: 41 MG/DL (ref 5–40)

## 2023-11-08 NOTE — TELEPHONE ENCOUNTER
Hub staff attempted to follow warm transfer process and was unsuccessful     Caller: Jimi Villar    Relationship to patient: Self    Best call back number:     Patient is needing: PATIENT RETURNING A CALL TO THE OFFICE.  PLEASE CALL THE PATIENT BACK.   [facility] UNABLE TO WARM TRANSFER.

## 2024-02-15 ENCOUNTER — OFFICE (AMBULATORY)
Dept: URBAN - METROPOLITAN AREA CLINIC 76 | Facility: CLINIC | Age: 44
End: 2024-02-15

## 2024-02-15 VITALS
WEIGHT: 235 LBS | HEIGHT: 74 IN | DIASTOLIC BLOOD PRESSURE: 84 MMHG | HEART RATE: 65 BPM | OXYGEN SATURATION: 95 % | SYSTOLIC BLOOD PRESSURE: 123 MMHG

## 2024-02-15 DIAGNOSIS — R13.10 DYSPHAGIA, UNSPECIFIED: ICD-10-CM

## 2024-02-15 DIAGNOSIS — Z87.19 PERSONAL HISTORY OF OTHER DISEASES OF THE DIGESTIVE SYSTEM: ICD-10-CM

## 2024-02-15 DIAGNOSIS — K22.70 BARRETT'S ESOPHAGUS WITHOUT DYSPLASIA: ICD-10-CM

## 2024-02-15 PROBLEM — K22.2 ESOPHAGEAL OBSTRUCTION: Status: ACTIVE | Noted: 2021-08-19

## 2024-02-15 PROCEDURE — 99214 OFFICE O/P EST MOD 30 MIN: CPT | Performed by: INTERNAL MEDICINE

## 2024-04-18 VITALS
WEIGHT: 235 LBS | DIASTOLIC BLOOD PRESSURE: 64 MMHG | HEART RATE: 64 BPM | RESPIRATION RATE: 14 BRPM | HEART RATE: 66 BPM | DIASTOLIC BLOOD PRESSURE: 70 MMHG | RESPIRATION RATE: 15 BRPM | SYSTOLIC BLOOD PRESSURE: 92 MMHG | RESPIRATION RATE: 16 BRPM | SYSTOLIC BLOOD PRESSURE: 104 MMHG | RESPIRATION RATE: 13 BRPM | HEART RATE: 73 BPM | RESPIRATION RATE: 12 BRPM | RESPIRATION RATE: 21 BRPM | HEART RATE: 89 BPM | DIASTOLIC BLOOD PRESSURE: 55 MMHG | OXYGEN SATURATION: 96 % | OXYGEN SATURATION: 92 % | SYSTOLIC BLOOD PRESSURE: 100 MMHG | OXYGEN SATURATION: 94 % | HEART RATE: 63 BPM | HEART RATE: 69 BPM | SYSTOLIC BLOOD PRESSURE: 98 MMHG | SYSTOLIC BLOOD PRESSURE: 94 MMHG | DIASTOLIC BLOOD PRESSURE: 49 MMHG | DIASTOLIC BLOOD PRESSURE: 52 MMHG | SYSTOLIC BLOOD PRESSURE: 107 MMHG | OXYGEN SATURATION: 100 % | SYSTOLIC BLOOD PRESSURE: 110 MMHG | HEIGHT: 74 IN | SYSTOLIC BLOOD PRESSURE: 93 MMHG | TEMPERATURE: 97.7 F | OXYGEN SATURATION: 98 % | HEART RATE: 67 BPM | SYSTOLIC BLOOD PRESSURE: 123 MMHG | HEART RATE: 75 BPM | HEART RATE: 79 BPM | SYSTOLIC BLOOD PRESSURE: 118 MMHG | HEART RATE: 62 BPM | DIASTOLIC BLOOD PRESSURE: 51 MMHG | DIASTOLIC BLOOD PRESSURE: 77 MMHG | HEART RATE: 76 BPM | TEMPERATURE: 97.6 F | DIASTOLIC BLOOD PRESSURE: 69 MMHG | DIASTOLIC BLOOD PRESSURE: 73 MMHG | DIASTOLIC BLOOD PRESSURE: 76 MMHG | SYSTOLIC BLOOD PRESSURE: 141 MMHG | SYSTOLIC BLOOD PRESSURE: 114 MMHG | HEART RATE: 65 BPM | OXYGEN SATURATION: 93 % | OXYGEN SATURATION: 95 % | SYSTOLIC BLOOD PRESSURE: 89 MMHG | OXYGEN SATURATION: 99 %

## 2024-04-25 ENCOUNTER — OFFICE (AMBULATORY)
Dept: URBAN - METROPOLITAN AREA PATHOLOGY 4 | Facility: PATHOLOGY | Age: 44
End: 2024-04-25

## 2024-04-25 ENCOUNTER — AMBULATORY SURGICAL CENTER (AMBULATORY)
Dept: URBAN - METROPOLITAN AREA SURGERY 17 | Facility: SURGERY | Age: 44
End: 2024-04-25
Payer: COMMERCIAL

## 2024-04-25 DIAGNOSIS — K21.00 GASTRO-ESOPHAGEAL REFLUX DISEASE WITH ESOPHAGITIS, WITHOUT B: ICD-10-CM

## 2024-04-25 DIAGNOSIS — K63.5 POLYP OF COLON: ICD-10-CM

## 2024-04-25 DIAGNOSIS — K22.2 ESOPHAGEAL OBSTRUCTION: ICD-10-CM

## 2024-04-25 DIAGNOSIS — R13.10 DYSPHAGIA, UNSPECIFIED: ICD-10-CM

## 2024-04-25 DIAGNOSIS — K44.9 DIAPHRAGMATIC HERNIA WITHOUT OBSTRUCTION OR GANGRENE: ICD-10-CM

## 2024-04-25 DIAGNOSIS — Z12.11 ENCOUNTER FOR SCREENING FOR MALIGNANT NEOPLASM OF COLON: ICD-10-CM

## 2024-04-25 DIAGNOSIS — K64.0 FIRST DEGREE HEMORRHOIDS: ICD-10-CM

## 2024-04-25 LAB
GI HISTOLOGY: B. LOWER THIRD OF THE ESOPHAGUS: (no result)
GI HISTOLOGY: C. UPPER THIRD OF THE ESOPHAGUS: (no result)
GI HISTOLOGY: PDF REPORT: (no result)
Lab: (no result)

## 2024-04-25 PROCEDURE — 43249 ESOPH EGD DILATION <30 MM: CPT | Performed by: INTERNAL MEDICINE

## 2024-04-25 PROCEDURE — 88305 TISSUE EXAM BY PATHOLOGIST: CPT | Performed by: PATHOLOGY

## 2024-04-25 PROCEDURE — 43239 EGD BIOPSY SINGLE/MULTIPLE: CPT | Mod: 59 | Performed by: INTERNAL MEDICINE

## 2024-04-25 PROCEDURE — 45385 COLONOSCOPY W/LESION REMOVAL: CPT | Mod: 33 | Performed by: INTERNAL MEDICINE

## 2024-04-25 NOTE — SERVICEHPINOTES
Mr. Chilo Wetzel is a 42 YO M with PMH of Gaytan’s, GERD, IBS-D, anxiety, and asthma presents for dysphagia and screening colon cancerHe is having dysphagia in November and December, more on solid and pills, no issues with liquid. He is taking pills with banana. He is taking lansoprazole 30mg daily. No nausea or vomiting. No abd pain. No unintended weight loss. No melena or hematochezia. No diarrhea or constipation. No family history of colon cancerSocial hx: denies tobacco, socially drink, occasional marijuana. no illicit Last colonoscopy age 18s. Data reviewed:br8/7/2017 EGD- LA-A esophagitis, gastritis, gastric polypbrPathology Report - mild chronic gastritis, no HP/IM. fundic gland polyp. +barretts, no dysplasiabr1/22/2018 - RUQ U/S - fatty liverbr4/27/2018 labs - creat 1.14, normal XVLobm212/21/2018 [facility]roSure - F0-1, S1-2, N1.br5/10/2019 - CT Abdomen Pelvis With Contrast - nL liver, GB, pancreas, spleen. diffuse bladder thickeningbr8/19/2021 EGD small HH, Ring, biopsies to bring it, Gaytan’s, no stomach or duodenal issues. Histology No Gaytan’s
br
br 4.25.2024 Interval history: patient is here for EGD and colonoscopy

## 2024-05-16 ENCOUNTER — OFFICE (AMBULATORY)
Dept: URBAN - METROPOLITAN AREA CLINIC 76 | Facility: CLINIC | Age: 44
End: 2024-05-16

## 2024-05-16 VITALS
OXYGEN SATURATION: 96 % | SYSTOLIC BLOOD PRESSURE: 132 MMHG | DIASTOLIC BLOOD PRESSURE: 86 MMHG | WEIGHT: 226 LBS | HEART RATE: 69 BPM | HEIGHT: 74 IN

## 2024-05-16 DIAGNOSIS — K22.70 BARRETT'S ESOPHAGUS WITHOUT DYSPLASIA: ICD-10-CM

## 2024-05-16 DIAGNOSIS — K22.10 ULCER OF ESOPHAGUS WITHOUT BLEEDING: ICD-10-CM

## 2024-05-16 DIAGNOSIS — R13.10 DYSPHAGIA, UNSPECIFIED: ICD-10-CM

## 2024-05-16 PROBLEM — K20.9 ESOPHAGITIS, UNSPECIFIED: Status: ACTIVE | Noted: 2017-08-07

## 2024-05-16 PROCEDURE — 99214 OFFICE O/P EST MOD 30 MIN: CPT | Performed by: INTERNAL MEDICINE

## 2024-08-30 ENCOUNTER — OFFICE VISIT (OUTPATIENT)
Dept: INTERNAL MEDICINE | Facility: CLINIC | Age: 44
End: 2024-08-30
Payer: COMMERCIAL

## 2024-08-30 VITALS
BODY MASS INDEX: 28.85 KG/M2 | WEIGHT: 224.8 LBS | DIASTOLIC BLOOD PRESSURE: 72 MMHG | HEART RATE: 74 BPM | RESPIRATION RATE: 16 BRPM | OXYGEN SATURATION: 99 % | TEMPERATURE: 98 F | HEIGHT: 74 IN | SYSTOLIC BLOOD PRESSURE: 118 MMHG

## 2024-08-30 DIAGNOSIS — B35.6 TINEA CRURIS: Primary | ICD-10-CM

## 2024-08-30 PROCEDURE — 99213 OFFICE O/P EST LOW 20 MIN: CPT | Performed by: FAMILY MEDICINE

## 2024-08-30 RX ORDER — FLUCONAZOLE 100 MG/1
100 TABLET ORAL DAILY
Qty: 7 TABLET | Refills: 1 | Status: SHIPPED | OUTPATIENT
Start: 2024-08-30

## 2024-08-30 NOTE — PROGRESS NOTES
"Chief Complaint  Rash (HAS HAD JOCK ITCH FOR MONTHS AND CAN'T GET RID OF IT)    Subjective        Jimi Villar presents to [facility]  History of Present Illness  Patient appointment to discuss rash in his scrotum for the last few weeks he has tried multiple over-the-counter remedies including lotions creams or washes there is been no specific changes diet he is not drinking more water no change in his underwear detergents no other rashes no fever sweats or chills    Objective   Vital Signs:  /72 (BP Location: Left arm, Patient Position: Sitting, Cuff Size: Adult)   Pulse 74   Temp 98 °F (36.7 °C) (Temporal)   Resp 16   Ht 188 cm (74.02\")   Wt 102 kg (224 lb 12.8 oz)   SpO2 99%   BMI 28.85 kg/m²   Estimated body mass index is 28.85 kg/m² as calculated from the following:    Height as of this encounter: 188 cm (74.02\").    Weight as of this encounter: 102 kg (224 lb 12.8 oz).          Physical Exam  Vitals and nursing note reviewed.   Constitutional:       Appearance: Normal appearance.   Skin:     Findings: Rash present.      Comments: Scrotum erythematous scaling healing groin area   Neurological:      Mental Status: He is alert.   Psychiatric:         Mood and Affect: Mood normal.         Behavior: Behavior normal.         Thought Content: Thought content normal.         Judgment: Judgment normal.        Result Review :    Common labs          11/7/2023    09:10   Common Labs   Glucose 108    BUN 12    Creatinine 1.08    Sodium 140    Potassium 4.7    Chloride 101    Calcium 9.7    Total Protein 7.3    Albumin 4.9    Total Bilirubin 0.7    Alkaline Phosphatase 86    AST (SGOT) 30    ALT (SGPT) 52    Total Cholesterol 215    Triglycerides 231    HDL Cholesterol 44    LDL Cholesterol  130    Hemoglobin A1C 5.60                Assessment and Plan   Diagnoses and all orders for this visit:    1. Tinea cruris (Primary)    Other orders  -     fluconazole (Diflucan) 100 MG " tablet; Take 1 tablet by mouth Daily.  Dispense: 7 tablet; Refill: 1    Aging preferences discussed minimal soap can use short-term hydrocortisone OTC for itching         Follow Up   Return if symptoms worsen or fail to improve, for Recheck.  Patient was given instructions and counseling regarding his condition or for health maintenance advice. Please see specific information pulled into the AVS if appropriate.